# Patient Record
Sex: FEMALE | Race: WHITE
[De-identification: names, ages, dates, MRNs, and addresses within clinical notes are randomized per-mention and may not be internally consistent; named-entity substitution may affect disease eponyms.]

---

## 2016-06-05 VITALS — SYSTOLIC BLOOD PRESSURE: 152 MMHG | DIASTOLIC BLOOD PRESSURE: 78 MMHG

## 2017-06-07 ENCOUNTER — HOSPITAL ENCOUNTER (OUTPATIENT)
Dept: HOSPITAL 61 - MAMMO | Age: 62
Discharge: HOME | End: 2017-06-07
Attending: FAMILY MEDICINE
Payer: COMMERCIAL

## 2017-06-07 DIAGNOSIS — Z12.31: Primary | ICD-10-CM

## 2017-06-08 NOTE — RAD
DATE: 6/7/2017



EXAM: DIGITAL SCREEN BILAT W/CAD



HISTORY: Routine screening



COMPARISON: 4/13/2016



This study was interpreted with the benefit of Computerized Aided Detection

(CAD).





The breast parenchyma is primarily fatty replaced. Breast parenchyma level

density A.





FINDINGS: Residual fibroglandular densities in the breasts are unchanged. No

new or enlarging breast densities are seen. Benign type calcifications are

present bilaterally. No suspicious microcalcifications have developed.  





IMPRESSION: Stable mammograms without evidence of malignancy.





BI-RADS CATEGORY: 2 BENIGN FINDING(S)



RECOMMENDED FOLLOW-UP: 12M 12 MONTH FOLLOW-UP



PQRS compliance statement: Patient information was entered into a reminder

system with a target due date     for the next mammogram.



Mammography is a sensitive method for finding small breast cancers, but it

does not detect them all and is not a substitute for careful clinical

examination.  A negative mammogram does not negate a clinically suspicious

finding and should not result in delay in biopsying a clinically suspicious

abnormality.



"Our facility is accredited by the [employer] Mammography

Program."

## 2018-01-24 ENCOUNTER — HOSPITAL ENCOUNTER (OUTPATIENT)
Dept: HOSPITAL 61 - ECHO | Age: 63
Discharge: HOME | End: 2018-01-24
Attending: INTERNAL MEDICINE
Payer: COMMERCIAL

## 2018-01-24 DIAGNOSIS — J44.9: Primary | ICD-10-CM

## 2018-01-24 DIAGNOSIS — R06.00: ICD-10-CM

## 2018-01-24 PROCEDURE — 93306 TTE W/DOPPLER COMPLETE: CPT

## 2018-06-06 ENCOUNTER — HOSPITAL ENCOUNTER (OUTPATIENT)
Dept: HOSPITAL 61 - US | Age: 63
Discharge: HOME | End: 2018-06-06
Attending: FAMILY MEDICINE
Payer: COMMERCIAL

## 2018-06-06 DIAGNOSIS — R16.0: Primary | ICD-10-CM

## 2018-06-06 DIAGNOSIS — Z90.49: ICD-10-CM

## 2018-06-06 PROCEDURE — 76700 US EXAM ABDOM COMPLETE: CPT

## 2020-06-11 ENCOUNTER — HOSPITAL ENCOUNTER (INPATIENT)
Dept: HOSPITAL 61 - ER | Age: 65
LOS: 4 days | Discharge: HOME | DRG: 871 | End: 2020-06-15
Attending: INTERNAL MEDICINE | Admitting: INTERNAL MEDICINE
Payer: COMMERCIAL

## 2020-06-11 VITALS — DIASTOLIC BLOOD PRESSURE: 62 MMHG | SYSTOLIC BLOOD PRESSURE: 116 MMHG

## 2020-06-11 VITALS — HEIGHT: 62 IN | WEIGHT: 293 LBS | BODY MASS INDEX: 53.92 KG/M2

## 2020-06-11 VITALS — DIASTOLIC BLOOD PRESSURE: 62 MMHG | SYSTOLIC BLOOD PRESSURE: 112 MMHG

## 2020-06-11 DIAGNOSIS — E11.22: ICD-10-CM

## 2020-06-11 DIAGNOSIS — E83.42: ICD-10-CM

## 2020-06-11 DIAGNOSIS — F32.9: ICD-10-CM

## 2020-06-11 DIAGNOSIS — Z88.8: ICD-10-CM

## 2020-06-11 DIAGNOSIS — K76.0: ICD-10-CM

## 2020-06-11 DIAGNOSIS — Z82.49: ICD-10-CM

## 2020-06-11 DIAGNOSIS — N20.0: ICD-10-CM

## 2020-06-11 DIAGNOSIS — Z90.710: ICD-10-CM

## 2020-06-11 DIAGNOSIS — E78.5: ICD-10-CM

## 2020-06-11 DIAGNOSIS — J44.9: ICD-10-CM

## 2020-06-11 DIAGNOSIS — I50.9: ICD-10-CM

## 2020-06-11 DIAGNOSIS — Z90.49: ICD-10-CM

## 2020-06-11 DIAGNOSIS — Z83.3: ICD-10-CM

## 2020-06-11 DIAGNOSIS — N18.9: ICD-10-CM

## 2020-06-11 DIAGNOSIS — N17.0: ICD-10-CM

## 2020-06-11 DIAGNOSIS — N39.0: ICD-10-CM

## 2020-06-11 DIAGNOSIS — E86.9: ICD-10-CM

## 2020-06-11 DIAGNOSIS — K21.9: ICD-10-CM

## 2020-06-11 DIAGNOSIS — E78.00: ICD-10-CM

## 2020-06-11 DIAGNOSIS — F41.9: ICD-10-CM

## 2020-06-11 DIAGNOSIS — A41.9: Primary | ICD-10-CM

## 2020-06-11 DIAGNOSIS — N12: ICD-10-CM

## 2020-06-11 DIAGNOSIS — I13.0: ICD-10-CM

## 2020-06-11 DIAGNOSIS — E66.01: ICD-10-CM

## 2020-06-11 DIAGNOSIS — I25.10: ICD-10-CM

## 2020-06-11 DIAGNOSIS — Z80.8: ICD-10-CM

## 2020-06-11 LAB
ALBUMIN SERPL-MCNC: 3.3 G/DL (ref 3.4–5)
ALBUMIN/GLOB SERPL: 0.7 {RATIO} (ref 1–1.7)
ALP SERPL-CCNC: 85 U/L (ref 46–116)
ALT SERPL-CCNC: 19 U/L (ref 14–59)
ANION GAP SERPL CALC-SCNC: 14 MMOL/L (ref 6–14)
APTT BLD: 30 SEC (ref 24–38)
APTT PPP: (no result) S
AST SERPL-CCNC: 27 U/L (ref 15–37)
BACTERIA #/AREA URNS HPF: (no result) /HPF
BASOPHILS # BLD AUTO: 0.1 X10^3/UL (ref 0–0.2)
BASOPHILS NFR BLD: 1 % (ref 0–3)
BILIRUB SERPL-MCNC: 0.8 MG/DL (ref 0.2–1)
BILIRUB UR QL STRIP: (no result)
BUN SERPL-MCNC: 72 MG/DL (ref 7–20)
BUN/CREAT SERPL: 16 (ref 6–20)
CALCIUM SERPL-MCNC: 9.1 MG/DL (ref 8.5–10.1)
CHLORIDE SERPL-SCNC: 95 MMOL/L (ref 98–107)
CK SERPL-CCNC: 522 U/L (ref 26–192)
CO2 SERPL-SCNC: 25 MMOL/L (ref 21–32)
CREAT SERPL-MCNC: 4.5 MG/DL (ref 0.6–1)
EOSINOPHIL NFR BLD: 0 % (ref 0–3)
EOSINOPHIL NFR BLD: 0 X10^3/UL (ref 0–0.7)
ERYTHROCYTE [DISTWIDTH] IN BLOOD BY AUTOMATED COUNT: 15 % (ref 11.5–14.5)
FIBRINOGEN PPP-MCNC: (no result) MG/DL
GFR SERPLBLD BASED ON 1.73 SQ M-ARVRAT: 9.8 ML/MIN
GLOBULIN SER-MCNC: 4.6 G/DL (ref 2.2–3.8)
GLUCOSE SERPL-MCNC: 187 MG/DL (ref 70–99)
HCT VFR BLD CALC: 36.2 % (ref 36–47)
HGB BLD-MCNC: 12.4 G/DL (ref 12–15.5)
LIPASE: 191 U/L (ref 73–393)
LYMPHOCYTES # BLD: 2.1 X10^3/UL (ref 1–4.8)
LYMPHOCYTES NFR BLD AUTO: 13 % (ref 24–48)
MAGNESIUM SERPL-MCNC: 1.5 MG/DL (ref 1.8–2.4)
MCH RBC QN AUTO: 29 PG (ref 25–35)
MCHC RBC AUTO-ENTMCNC: 34 G/DL (ref 31–37)
MCV RBC AUTO: 84 FL (ref 79–100)
MONO #: 1.5 X10^3/UL (ref 0–1.1)
MONOCYTES NFR BLD: 10 % (ref 0–9)
NEUT #: 12 X10^3/UL (ref 1.8–7.7)
NEUTROPHILS NFR BLD AUTO: 76 % (ref 31–73)
NITRITE UR QL STRIP: NEGATIVE
PH UR STRIP: 6 [PH]
PLATELET # BLD AUTO: 400 X10^3/UL (ref 140–400)
POTASSIUM SERPL-SCNC: 4.9 MMOL/L (ref 3.5–5.1)
PROT SERPL-MCNC: 7.9 G/DL (ref 6.4–8.2)
PROT UR STRIP-MCNC: 100 MG/DL
PROTHROMBIN TIME: 14 SEC (ref 11.7–14)
RBC # BLD AUTO: 4.33 X10^6/UL (ref 3.5–5.4)
RBC #/AREA URNS HPF: (no result) /HPF (ref 0–2)
SODIUM SERPL-SCNC: 134 MMOL/L (ref 136–145)
SQUAMOUS #/AREA URNS LPF: (no result) /LPF
UROBILINOGEN UR-MCNC: 0.2 MG/DL
WBC # BLD AUTO: 15.7 X10^3/UL (ref 4–11)
WBC #/AREA URNS HPF: >40 /HPF (ref 0–4)

## 2020-06-11 PROCEDURE — 74176 CT ABD & PELVIS W/O CONTRAST: CPT

## 2020-06-11 PROCEDURE — 71250 CT THORAX DX C-: CPT

## 2020-06-11 PROCEDURE — 36415 COLL VENOUS BLD VENIPUNCTURE: CPT

## 2020-06-11 PROCEDURE — 83550 IRON BINDING TEST: CPT

## 2020-06-11 PROCEDURE — 83690 ASSAY OF LIPASE: CPT

## 2020-06-11 PROCEDURE — 82553 CREATINE MB FRACTION: CPT

## 2020-06-11 PROCEDURE — 82378 CARCINOEMBRYONIC ANTIGEN: CPT

## 2020-06-11 PROCEDURE — 96376 TX/PRO/DX INJ SAME DRUG ADON: CPT

## 2020-06-11 PROCEDURE — 83735 ASSAY OF MAGNESIUM: CPT

## 2020-06-11 PROCEDURE — 80053 COMPREHEN METABOLIC PANEL: CPT

## 2020-06-11 PROCEDURE — 96375 TX/PRO/DX INJ NEW DRUG ADDON: CPT

## 2020-06-11 PROCEDURE — 85610 PROTHROMBIN TIME: CPT

## 2020-06-11 PROCEDURE — 82962 GLUCOSE BLOOD TEST: CPT

## 2020-06-11 PROCEDURE — 83605 ASSAY OF LACTIC ACID: CPT

## 2020-06-11 PROCEDURE — 80048 BASIC METABOLIC PNL TOTAL CA: CPT

## 2020-06-11 PROCEDURE — 96365 THER/PROPH/DIAG IV INF INIT: CPT

## 2020-06-11 PROCEDURE — 83540 ASSAY OF IRON: CPT

## 2020-06-11 PROCEDURE — 81001 URINALYSIS AUTO W/SCOPE: CPT

## 2020-06-11 PROCEDURE — 82010 KETONE BODYS QUAN: CPT

## 2020-06-11 PROCEDURE — 87086 URINE CULTURE/COLONY COUNT: CPT

## 2020-06-11 PROCEDURE — G0378 HOSPITAL OBSERVATION PER HR: HCPCS

## 2020-06-11 PROCEDURE — 93005 ELECTROCARDIOGRAM TRACING: CPT

## 2020-06-11 PROCEDURE — 76857 US EXAM PELVIC LIMITED: CPT

## 2020-06-11 PROCEDURE — 87040 BLOOD CULTURE FOR BACTERIA: CPT

## 2020-06-11 PROCEDURE — 85025 COMPLETE CBC W/AUTO DIFF WBC: CPT

## 2020-06-11 PROCEDURE — 82607 VITAMIN B-12: CPT

## 2020-06-11 PROCEDURE — 85730 THROMBOPLASTIN TIME PARTIAL: CPT

## 2020-06-11 PROCEDURE — 86304 IMMUNOASSAY TUMOR CA 125: CPT

## 2020-06-11 PROCEDURE — 96361 HYDRATE IV INFUSION ADD-ON: CPT

## 2020-06-11 PROCEDURE — 84484 ASSAY OF TROPONIN QUANT: CPT

## 2020-06-11 NOTE — PHYS DOC
Past Medical History


Past Medical History:  CHF, COPD, Diabetes-Type II, High Cholesterol, Hype

rtension, Other


Additional Past Medical Histor:  generalized debility


Past Surgical History:  Cholecystectomy, Hysterectomy, Tubal ligation, Other


Additional Past Surgical Histo:  bladder,cyst removed rt leg


Smoking Status:  Never Smoker


Alcohol Use:  None


Drug Use:  None





General Adult


EDM:


Chief Complaint:  MULTIPLE COMPLAINTS





HPI:


HPI:





Patient is a 65 year old female presents with 3-day history of right flank pain,

right abdominal pain, sweatiness, and shortness of breath.  Patient reports 

associated loss of appetite.  Patient denies any fever or chills.  Denies chest 

pain.  Reports dysuria.  Patient was seen by Dr. Deleon (PCP), who sent patient 

in to ED to be evaluated.  Denies known exposure to COVID-19.  Denies recent 

travel.





Review of Systems:


Review of Systems:





Constitutional: Denies fever or chills; reports generalized malaise


Eyes: Denies redness or eye pain 


HENT: Denies nasal congestion or sore throat


Respiratory: Reports cough and shortness of breath


Cardiovascular: Denies chest pain or palpitations


GI: Reports abdominal pain and nausea; denies vomiting


: Reports dysuria; denies hematuria


Musculoskeletal: Denies calves pain; reports right flank pain


Integument: Denies rash or skin lesions; reports diaphoresis


Neurologic: Denies headache, focal weakness or sensory changes





Complete systems were reviewed and found to be within normal limits, except as 

documented in this note.





Current Medications:





Current Medications








 Medications


  (Trade)  Dose


 Ordered  Sig/[facility]  Start Time


 Stop Time Status Last Admin


Dose Admin


 


 Famotidine


  (Pepcid Vial)  20 mg  1X  ONCE  6/11/20 16:15


 6/11/20 16:22 DC  





 


 Ondansetron HCl


  (Zofran)  4 mg  1X  ONCE  6/11/20 16:15


 6/11/20 16:22 DC  





 


 Sodium Chloride  1,000 ml @ 


 1,000 mls/hr  1X  ONCE  6/11/20 16:15


 6/11/20 17:14   














Allergies:


Allergies:





Allergies








Coded Allergies Type Severity Reaction Last Updated Verified


 


  No Known Medication Allergies Allergy Unknown  12/26/18 Yes


 


  sitagliptin Adverse Reaction Intermediate HAIR LOSS 12/26/18 Yes











Physical Exam:


PE:





Constitutional: Well developed, obese, uncomfortable


HENT: Normocephalic, atraumatic


Eyes: Conjunctiva normal, no discharge


Neck: Normal range of motion, no tenderness, supple


Cardiovascular: Heart rate normal, regular rhythm


Lungs & Thorax:  Bilateral breath sounds diminished at bases, + wheezing


Abdomen: Soft, distended, no tenderness


Skin: Warm, dry, no erythema, no rash


Back: No tenderness, right CVA tenderness


Extremities: No tenderness, ROM intact, trace bilateral lower extremity edema


Neurologic: Alert and oriented X 3,  no focal deficits noted


Psychologic: Affect normal, judgment normal





EKG:


EKG:


@1625 NSR at 99bpm, NO ST elevation, QRS 78ms, QT/QTc 328/421ms





Radiology/Procedures:


Radiology/Procedures:


PROCEDURE: CT CHEST ABDOMEN PELVIS WO





STUDY: CT chest, abdomen and pelvis without contrast


 


INDICATION: Shortness of air. Right flank pain.


 


COMPARISON: None available.


 


TECHNIQUE: Helical CT imaging of the chest, abdomen and pelvis performed 


without the use of intravenous contrast. Coronal and sagittal reformats 


were obtained.


 


One or more of the following individualized dose reduction techniques were


utilized for this examination:  


 


1. Automated exposure control


2. Adjustment of the mA and/or kV according to patient size


3. Use of iterative reconstruction technique.


 


FINDINGS:


 


CHEST:


 


Degraded study due to body habitus. Portions of the body wall soft tissues


are excluded from the field-of-view.


 


Calcific coronary artery disease. Nonaneurysmal aorta. Mitral annular 


calcifications.


 


No pericardial effusion or ileus tunnel/hilar lymphadenopathy.


 


Left upper lobe calcified granuloma. No suspicious nodule or infiltrate. 


Mild right more so than left basilar volume loss.


 


No acute abnormality of the body wall soft tissues included in the 


field-of-view. Scattered degenerative osseous findings.


 


Abdomen/pelvis:


 


Portions of the body wall as well as a small portion of the right abdomen 


are excluded from the field-of-view. Inherently limited study without 


intravenous contrast. No focal hepatic parenchymal abnormality is 


identified. The liver is prominent in size. Status post cholecystectomy. 


Unremarkable pancreas. Mild splenomegaly. No adrenal gland mass. 


 


Bilateral perinephric fat stranding slightly more pronounced on the right.


Somewhat linear intrarenal stone on image 43 series 6 measuring 7 mm AP. 


No hydroureteronephrosis. Mostly decompressed urinary bladder. No uterus 


is seen. Radiodensities at the left adnexa could be related to surgical 


resection of the left ovary. No normal ovary is seen on the right. At the 


lower aspect of the abdomen/upper pelvis is a lobulated mass with an 


internal density of approximately 40 Hounsfield units that measures 


approximately 9.3 x 9.2 x 6.9 cm. The right ureter appears to be displaced


medially by this mass but not obstructed.


 


No focal abnormality of the colon. Some incompletely formed stool is seen 


within the more proximal colon. The appendix is not well delineated. 


Nonobstructed small bowel. Limited assessment of the stomach as well as 


the rest of the bowel without oral contrast.


 


Mild scattered calcific atherosclerosis. Nonaneurysmal aorta.


 


Scattered degenerative osseous findings with greatest involvement of the 


lower lumbar facet joints. Grade 1 anterolisthesis of L4 on L5. Osseous 


neural foraminal encroachment on the right at L4-L5.


 


IMPRESSION:


 


CHEST:


1. Degraded study due to body habitus. Portions of the chest wall are 


outside the field-of-view.


2. No acute abnormality seen throughout the chest.


3. Chronic findings to include calcific coronary artery disease.


 


Abdomen/pelvis:


1. Portions of the body wall and a small portion of the right abdomen are 


excluded from the field-of-view.


2. Mild right more so than left perinephric fat stranding but no 


obstructing stone is seen. There is a 7 mm somewhat linear intrarenal 


stone on the right. In the setting of perinephric fat stranding consider 


correlation with urinalysis to exclude a urinary tract infection.


3. Lobulated mass-like structure at the lower abdomen extending into the 


upper pelvis measuring 9.3 x 9.2 x 6.9 cm. No uterus is seen and the left 


ovary may be surgically absent. No normal right ovary is identified and it


is possible that this mass is ovarian in origin. The internal density is 


approximately 40 Hounsfield units confirming that this is not a simple 


ovarian cyst or simple postoperative fluid collection. Sonography would be


useful to help assess for malignant features but could be limited due to 


patient body habitus. If renal function permits the use of contrast, 


eventual CT or MRI of this region could be performed. If the patient 


cannot receive contrast and ultrasound is unrevealing, unenhanced MRI 


would be the next best study.


4. Hepatosplenomegaly.


 


Electronically signed by: MONIQUE MADDOX MD (6/11/2020 6:13 PM) [id number]





Course & Med Decision Making:


Course & Med Decision Making


Pertinent Labs and Imaging studies reviewed. (See chart for details)





Patient presents after being evaluated by Dr. Deleon with 3-day history of 

worsening shortness of air, abdominal pain, and right flank pain.  Patient 

reports associated loss of appetite.  Labs obtained and posted to chart.  

Creatinine 4.5. UA with findings significant for infection.  WBC elevated. 

Lactic acid WNL. 





CT abd/pelvis with findings of perinephric fat stranding R > L.  Notation of 

large lower abdominal mass also noted.





Patient requiring admission for further evaluation and treatment. Discussed with

Dr. Burgos (hospitalist) who is in agreement with admission. Discussed findings 

and plan with patient, who acknowledges understanding and agreement.





Dragon Disclaimer:


Dragon Disclaimer:


This electronic medical record was generated, in whole or in part, using a voice

recognition dictation system.





Departure


Departure


Impression:  


   Primary Impression:  


   Pyelonephritis


   Additional Impressions:  


   Renal failure


   Qualified Codes:  N17.9 - Acute kidney failure, unspecified


   Hypomagnesemia


   Abdominal mass


   Qualified Codes:  R19.03 - Right lower quadrant abdominal swelling, mass and 

   lump


Disposition:  09 ADMITTED AS INPATIENT


Admitting Physician:  HIMS ([place])


Condition:  STABLE


Referrals:  


AYAKA MARSHALL MD (PCP)





Justicifation of Admission Dx:


Justifications for Admission:


Justification of Admission Dx:  Yes


Acute Renal Failure:  Serum Cr > 4mg/dL


Comments:


Abdominal mass, Pyelonephritis











WAI SHAIKH DO             Jun 11, 2020 16:23

## 2020-06-11 NOTE — HP
ADMIT DATE:  06/11/2020



CHIEF COMPLAINT:  Multiple complaints.



HISTORY OF PRESENT ILLNESS:  The patient is a pleasant 65-year-old female who

presented with right abdominal pain.  She has been feeling clammy.  She has been

short of breath.  She also has a decreased appetite, this has been occurring for

several days.  She was seen by her primary care doctor and was told to go to the

ER for further evaluation.  While in the ER, we have noticed that she is in

acute renal failure with a creatinine of 4.5.  She also has a 10 cm mass in the

right lower quadrant of her abdomen/pelvis.  I discussed the case with ER

physician.  We are going to admit the patient and consult OB/GYN, Oncology and

Nephrology.



PAST MEDICAL HISTORY:  CHF, COPD, diabetes, hypertension, hyperlipidemia,

obesity, cholecystectomy, hysterectomy, tubal ligation, bladder cyst, right leg

surgery.



ALLERGIES:  SITAGLIPTIN.



FAMILY HISTORY:  Diabetes.



SOCIAL HISTORY:  She does not drink, smoke or take drugs.



MEDICATIONS:  Reviewed, please refer to the MRAD.



REVIEW OF SYSTEMS: 

GENERAL:  No history of weight change, weakness or fevers.

SKIN:  No bruising, hair changes or rashes.

EYES:  No blurred, double or loss of vision.

NOSE AND THROAT:  No history of nosebleeds, hoarseness or sore throat.

HEART:  No history of palpitations, chest pain or shortness of breath on

exertion.

LUNGS:  Denies cough, hemoptysis or wheezing.  She complains of some shortness

of breath.

GASTROINTESTINAL:  She complains of abdominal pain.

GENITOURINARY:  No history of frequency, urgency, hesitancy or nocturia.

NEUROLOGIC:  Denies history of numbness, tingling, tremor or weakness.

PSYCHIATRIC:  No history of panic, anxiety or depression.

ENDOCRINE:  No history of heat or cold intolerance, polyuria or polydipsia.

EXTREMITIES:  Denies muscle weakness, joint pain, pain on walking or stiffness.



PHYSICAL EXAMINATION:

VITALS:  Within normal limits and are stable.

GENERAL:  No apparent distress.  Alert and oriented.  She is little obese.

HEENT:   Normal cephalic atraumatic, external auditory canals are patent

EYES:  Extraocular muscles are intact, pupils are equally round and reactive to

light and accommodation

MUSCULOSKELETAL:  Well developed, well nourished, good range of motion

ENDOCRINE:  No thyromegaly was palpated

LYMPHATICS:  No cervical chain or axillary nodes were noted

HEMATOPOIETIC:  No bruising

NECK:  Supple, no JVD, no thyromegaly was noted.

LUNGS:   Clear to auscultation in all lung fields without rhonchi or wheezing.

HEART:  RRR, S1, S2 present.  Peripheral pulses intact, no obvious murmurs were

noted.

ABDOMEN:  Soft.  Tender in the right lower quadrant to palpation.   Positive

bowel sounds no organomegaly, normal bowel sounds.

EXTREMITIES:   Without any cyanosis, clubbing, or edema.  Pedal pulses intact,

Homans sign is negative.

NEUROLOGIC:  Normal speech, normal tone.  A & O x3, moves all extremities, no

obvious focal deficits.

PSYCHIATRIC:  Normal affect, normal mood.  Stable.

SKIN:  No ulcerations or rashes, good skin turgor, no jaundice.

VASCULAR:  Good capillary refill, neurovascular bundle appears to be intact.



LABORATORY DATA:  CT of the abdomen shows a lobulated mass-like structure in the

lower abdomen extending into the upper pelvis measuring 9.3 x 9.2 x 6.9 cm. 

Creatinine is 4.2.



ASSESSMENT AND PLAN:  Acute kidney injury with incidental finding of possible

malignancy in the abdomen/pelvis.  The patient will be admitted.  We will

consult Nephrology, consult OB/GYN, and consult Oncology.  Home meds, DVT

prophylaxis.  Full code.  IV fluids.  She also has UTI, so we gave her some IV

Rocephin.  Her magnesium was a little low.  We gave her 2 grams of IV magnesium

sulfate.  Trend labs, p.r.n. Zofran, p.r.n. Pepcid.



LONG-TERM PROGNOSIS:  Guarded.

 



______________________________

ONI HALE DO



DR:  CHILO/javier  JOB#:  [id number] / [id number]

DD:  06/11/2020 20:22  DT:  06/11/2020 20:40



WAI Salguero MD

## 2020-06-11 NOTE — NUR
The patient, CECI GEIGER, 64 y/o, F admitted by ONI HALE III, DO, was given written 
information regarding hospital policies, unit procedures and contact persons. patient was 
transferred to room via ED bed assisted by ED staff member. 



Valuables were checked and noted. patient is currently laying in bed watching TV and talking 
on the phone. Patient denies any needs at this time. This RN will continue to monitor the 
patient at this time.

## 2020-06-11 NOTE — RAD
STUDY: CT chest, abdomen and pelvis without contrast

 

INDICATION: Shortness of air. Right flank pain.

 

COMPARISON: None available.

 

TECHNIQUE: Helical CT imaging of the chest, abdomen and pelvis performed 

without the use of intravenous contrast. Coronal and sagittal reformats 

were obtained.

 

One or more of the following individualized dose reduction techniques were

utilized for this examination:  

 

1. Automated exposure control

2. Adjustment of the mA and/or kV according to patient size

3. Use of iterative reconstruction technique.

 

FINDINGS:

 

CHEST:

 

Degraded study due to body habitus. Portions of the body wall soft tissues

are excluded from the field-of-view.

 

Calcific coronary artery disease. Nonaneurysmal aorta. Mitral annular 

calcifications.

 

No pericardial effusion or ileus tunnel/hilar lymphadenopathy.

 

Left upper lobe calcified granuloma. No suspicious nodule or infiltrate. 

Mild right more so than left basilar volume loss.

 

No acute abnormality of the body wall soft tissues included in the 

field-of-view. Scattered degenerative osseous findings.

 

Abdomen/pelvis:

 

Portions of the body wall as well as a small portion of the right abdomen 

are excluded from the field-of-view. Inherently limited study without 

intravenous contrast. No focal hepatic parenchymal abnormality is 

identified. The liver is prominent in size. Status post cholecystectomy. 

Unremarkable pancreas. Mild splenomegaly. No adrenal gland mass. 

 

Bilateral perinephric fat stranding slightly more pronounced on the right.

Somewhat linear intrarenal stone on image 43 series 6 measuring 7 mm AP. 

No hydroureteronephrosis. Mostly decompressed urinary bladder. No uterus 

is seen. Radiodensities at the left adnexa could be related to surgical 

resection of the left ovary. No normal ovary is seen on the right. At the 

lower aspect of the abdomen/upper pelvis is a lobulated mass with an 

internal density of approximately 40 Hounsfield units that measures 

approximately 9.3 x 9.2 x 6.9 cm. The right ureter appears to be displaced

medially by this mass but not obstructed.

 

No focal abnormality of the colon. Some incompletely formed stool is seen 

within the more proximal colon. The appendix is not well delineated. 

Nonobstructed small bowel. Limited assessment of the stomach as well as 

the rest of the bowel without oral contrast.

 

Mild scattered calcific atherosclerosis. Nonaneurysmal aorta.

 

Scattered degenerative osseous findings with greatest involvement of the 

lower lumbar facet joints. Grade 1 anterolisthesis of L4 on L5. Osseous 

neural foraminal encroachment on the right at L4-L5.

 

IMPRESSION:

 

CHEST:

1. Degraded study due to body habitus. Portions of the chest wall are 

outside the field-of-view.

2. No acute abnormality seen throughout the chest.

3. Chronic findings to include calcific coronary artery disease.

 

Abdomen/pelvis:

1. Portions of the body wall and a small portion of the right abdomen are 

excluded from the field-of-view.

2. Mild right more so than left perinephric fat stranding but no 

obstructing stone is seen. There is a 7 mm somewhat linear intrarenal 

stone on the right. In the setting of perinephric fat stranding consider 

correlation with urinalysis to exclude a urinary tract infection.

3. Lobulated mass-like structure at the lower abdomen extending into the 

upper pelvis measuring 9.3 x 9.2 x 6.9 cm. No uterus is seen and the left 

ovary may be surgically absent. No normal right ovary is identified and it

is possible that this mass is ovarian in origin. The internal density is 

approximately 40 Hounsfield units confirming that this is not a simple 

ovarian cyst or simple postoperative fluid collection. Sonography would be

useful to help assess for malignant features but could be limited due to 

patient body habitus. If renal function permits the use of contrast, 

eventual CT or MRI of this region could be performed. If the patient 

cannot receive contrast and ultrasound is unrevealing, unenhanced MRI 

would be the next best study.

4. Hepatosplenomegaly.

 

Electronically signed by: MONIQUE MADDOX MD (6/11/2020 6:13 PM) [id number]

## 2020-06-12 VITALS — DIASTOLIC BLOOD PRESSURE: 49 MMHG | SYSTOLIC BLOOD PRESSURE: 111 MMHG

## 2020-06-12 VITALS — DIASTOLIC BLOOD PRESSURE: 41 MMHG | SYSTOLIC BLOOD PRESSURE: 112 MMHG

## 2020-06-12 VITALS — DIASTOLIC BLOOD PRESSURE: 66 MMHG | SYSTOLIC BLOOD PRESSURE: 121 MMHG

## 2020-06-12 VITALS — SYSTOLIC BLOOD PRESSURE: 110 MMHG | DIASTOLIC BLOOD PRESSURE: 41 MMHG

## 2020-06-12 VITALS — SYSTOLIC BLOOD PRESSURE: 106 MMHG | DIASTOLIC BLOOD PRESSURE: 62 MMHG

## 2020-06-12 VITALS — SYSTOLIC BLOOD PRESSURE: 109 MMHG | DIASTOLIC BLOOD PRESSURE: 46 MMHG

## 2020-06-12 LAB
ALBUMIN SERPL-MCNC: 3 G/DL (ref 3.4–5)
ALBUMIN/GLOB SERPL: 0.7 {RATIO} (ref 1–1.7)
ALP SERPL-CCNC: 84 U/L (ref 46–116)
ALT SERPL-CCNC: 20 U/L (ref 14–59)
ANION GAP SERPL CALC-SCNC: 13 MMOL/L (ref 6–14)
AST SERPL-CCNC: 25 U/L (ref 15–37)
BASOPHILS # BLD AUTO: 0.1 X10^3/UL (ref 0–0.2)
BASOPHILS NFR BLD: 1 % (ref 0–3)
BILIRUB SERPL-MCNC: 0.4 MG/DL (ref 0.2–1)
BUN SERPL-MCNC: 72 MG/DL (ref 7–20)
BUN/CREAT SERPL: 23 (ref 6–20)
CALCIUM SERPL-MCNC: 8.7 MG/DL (ref 8.5–10.1)
CHLORIDE SERPL-SCNC: 97 MMOL/L (ref 98–107)
CO2 SERPL-SCNC: 26 MMOL/L (ref 21–32)
CREAT SERPL-MCNC: 3.2 MG/DL (ref 0.6–1)
EOSINOPHIL NFR BLD: 0.1 X10^3/UL (ref 0–0.7)
EOSINOPHIL NFR BLD: 1 % (ref 0–3)
ERYTHROCYTE [DISTWIDTH] IN BLOOD BY AUTOMATED COUNT: 15 % (ref 11.5–14.5)
GFR SERPLBLD BASED ON 1.73 SQ M-ARVRAT: 14.5 ML/MIN
GLOBULIN SER-MCNC: 4.4 G/DL (ref 2.2–3.8)
GLUCOSE SERPL-MCNC: 221 MG/DL (ref 70–99)
HCT VFR BLD CALC: 35.1 % (ref 36–47)
HGB BLD-MCNC: 11.8 G/DL (ref 12–15.5)
LYMPHOCYTES # BLD: 1.9 X10^3/UL (ref 1–4.8)
LYMPHOCYTES NFR BLD AUTO: 20 % (ref 24–48)
MCH RBC QN AUTO: 28 PG (ref 25–35)
MCHC RBC AUTO-ENTMCNC: 34 G/DL (ref 31–37)
MCV RBC AUTO: 83 FL (ref 79–100)
MONO #: 1.1 X10^3/UL (ref 0–1.1)
MONOCYTES NFR BLD: 12 % (ref 0–9)
NEUT #: 6.3 X10^3/UL (ref 1.8–7.7)
NEUTROPHILS NFR BLD AUTO: 67 % (ref 31–73)
PLATELET # BLD AUTO: 344 X10^3/UL (ref 140–400)
POTASSIUM SERPL-SCNC: 4.2 MMOL/L (ref 3.5–5.1)
PROT SERPL-MCNC: 7.4 G/DL (ref 6.4–8.2)
RBC # BLD AUTO: 4.2 X10^6/UL (ref 3.5–5.4)
SODIUM SERPL-SCNC: 136 MMOL/L (ref 136–145)
WBC # BLD AUTO: 9.5 X10^3/UL (ref 4–11)

## 2020-06-12 RX ADMIN — ATORVASTATIN CALCIUM SCH MG: 10 TABLET, FILM COATED ORAL at 21:26

## 2020-06-12 RX ADMIN — PANTOPRAZOLE SODIUM SCH MG: 40 TABLET, DELAYED RELEASE ORAL at 14:03

## 2020-06-12 RX ADMIN — POTASSIUM CHLORIDE SCH MEQ: 1500 TABLET, EXTENDED RELEASE ORAL at 16:39

## 2020-06-12 RX ADMIN — CEFTRIAXONE SCH GM: 1 INJECTION, POWDER, FOR SOLUTION INTRAMUSCULAR; INTRAVENOUS at 14:03

## 2020-06-12 RX ADMIN — FUROSEMIDE SCH MG: 40 TABLET ORAL at 14:04

## 2020-06-12 RX ADMIN — REPAGLINIDE SCH MG: 0.5 TABLET ORAL at 16:39

## 2020-06-12 RX ADMIN — INSULIN LISPRO SCH UNITS: 100 INJECTION, SOLUTION INTRAVENOUS; SUBCUTANEOUS at 17:10

## 2020-06-12 RX ADMIN — BACITRACIN SCH MLS/HR: 5000 INJECTION, POWDER, FOR SOLUTION INTRAMUSCULAR at 14:03

## 2020-06-12 RX ADMIN — CITALOPRAM HYDROBROMIDE SCH MG: 20 TABLET ORAL at 14:04

## 2020-06-12 RX ADMIN — TIZANIDINE SCH MG: 4 TABLET ORAL at 21:26

## 2020-06-12 RX ADMIN — POTASSIUM CHLORIDE SCH MEQ: 1500 TABLET, EXTENDED RELEASE ORAL at 14:04

## 2020-06-12 RX ADMIN — INSULIN GLARGINE SCH UNIT: 100 INJECTION, SOLUTION SUBCUTANEOUS at 21:00

## 2020-06-12 RX ADMIN — ASPIRIN 81 MG SCH MG: 81 TABLET ORAL at 14:04

## 2020-06-12 RX ADMIN — BACITRACIN SCH MLS/HR: 5000 INJECTION, POWDER, FOR SOLUTION INTRAMUSCULAR at 23:04

## 2020-06-12 RX ADMIN — LEVOTHYROXINE SODIUM SCH MCG: 88 TABLET ORAL at 14:04

## 2020-06-12 NOTE — EKG
[facility]

              [address] [place] [address]

Test Date:    2020               Test Time:    16:25:06

Pat Name:     CECI GEIGER             Department:   

Patient ID:   [id number]           Room:         [facility]

Gender:       F                        Technician:   

:          [id number]               Requested By: WAI SHAIKH

Order Number: [id number]           Reading MD:   Ok Frias

                                 Measurements

Intervals                              Axis          

Rate:         99                       P:            41

MS:           126                      QRS:          28

QRSD:         78                       T:            65

QT:           328                                    

QTc:          421                                    

                           Interpretive Statements

SINUS RHYTHM





Electronically Signed On 2020 16:43:20 CDT by Ok Frias

## 2020-06-12 NOTE — PDOC2
CONSULT


Date of Consult


Date of Consult


DATE: 6/12/20 


TIME: 11:07





Reason for Consult


Reason for Consult:


RENAL FAILURE





Referring Physician


Referring Physician:


TAMRA





Identification/Chief Complaint


Chief Complaint


ABD PAIN





Source


Source:  Chart review, Patient





History of Present Illness


Reason for Visit:


THIS IS A 65 YR CF WITH RIGHT SIDED ABD AND BILATERAL FLANK PAIN. IMAGING 

CONCERNING FOR INTRARENAL STONE ON THE RIGHT AND STRANDING CONCERNING FOR 

INFECTION. IMAGING ALSO CONCERNING FOR RLQ MASS. KNOWN CKD PER PT. LAST KNOWN 

LABS HERE FROM 2016 INDICATED NO CKD. NO NEPHROTOXINS NOTED. STATES THAT FOR THE

LAST 4 DAYS SHE HAS NOT BEEN ABLE TO EAT MUCH DUE TO N/V AND DIARRHEA. NAUSEA 

PERSISTS BUT DIARRHEA IS BETTER. DENIED ANY FEVERS





Past Medical History


Past Medical History





PAST MEDICAL HISTORY:  CHF, COPD, diabetes, hypertension, hyperlipidemia,


obesity, cholecystectomy, hysterectomy, tubal ligation, bladder cyst, right leg


surgery.


Infectious disease:  No pertinent hx


Renal/:  No pertinent hx





Past Surgical History


Past Surgical History


AS ABOVE





Family History


Family History:  Hypertension





Current Problem List


Problem List


Problems


Medical Problems:


(1) Abdominal mass


Status: Acute  





(2) Hypomagnesemia


Status: Acute  





(3) Pyelonephritis


Status: Acute  





(4) Renal failure


Status: Acute  











Current Medications


Current Medications





Current Medications


Ondansetron HCl (Zofran) 4 mg 1X  ONCE IVP  Last administered on 6/11/20at 

16:54;  Start 6/11/20 at 16:15;  Stop 6/11/20 at 16:22;  Status DC


Famotidine (Pepcid Vial) 20 mg 1X  ONCE IVP  Last administered on 6/11/20at 

16:55;  Start 6/11/20 at 16:15;  Stop 6/11/20 at 16:22;  Status DC


Sodium Chloride 1,000 ml @  1,000 mls/hr 1X  ONCE IV  Last administered on 

6/11/20at 16:53;  Start 6/11/20 at 16:15;  Stop 6/11/20 at 17:14;  Status DC


Ceftriaxone Sodium (Rocephin) 1 gm 1X  ONCE IVP  Last administered on 6/11/20at 

18:24;  Start 6/11/20 at 18:00;  Stop 6/11/20 at 18:01;  Status DC


Magnesium Sulfate 50 ml @ 25 mls/hr 1X  ONCE IV  Last administered on 6/11/20at 

18:31;  Start 6/11/20 at 18:15;  Stop 6/11/20 at 20:14;  Status DC





Allergies


Allergies:  


Coded Allergies:  


     sitagliptin (Verified  Adverse Reaction, Intermediate, HAIR LOSS, 12/26/18)





ROS


General:  YES: Fatigue, Malaise, Appetite


PSYCHOLOGICAL ROS:  YES: Anxiety, Depression


Eyes:  Yes Decreased vision


ALLERGY AND IMMUNOLOGY:  YES: Seasonal Allergies


Respiratory:  YES: Cough


Gastrointestinal:  Yes Nausea, Yes Vomiting, Yes Diarrhea


Genitourinary:  YES Other (DECREASED UO)


Musculoskeletal:  Yes Muscular Weakness


Neurological:  Yes Weakness


Skin:  Yes Dry Skin





Physical Exam


General:  Alert, Oriented X3, Cooperative, No acute distress


HEENT:  Atraumatic, PERRLA, EOMI, Mucous membr. moist/pink


Lungs:  Clear to auscultation


Heart:  Regular rate


Abdomen:  Normal bowel sounds, Soft, No tenderness


Extremities:  No cyanosis


Skin:  No breakdown


Neuro:  Normal speech


Psych/Mental Status:  Mental status NL, Mood NL


MUSCULOSKELETAL:  No joint tenderness, No deformity





Vitals


VITALS





Vital Signs








  Date Time  Temp Pulse Resp B/P (MAP) Pulse Ox O2 Delivery O2 Flow Rate FiO2


 


6/12/20 07:40      Nasal Cannula 3.0 


 


6/12/20 07:35 98.2 85 18 111/49 (69) 98   





 98.2       











Labs


Labs





Laboratory Tests








Test


 6/11/20


16:50 6/11/20


21:15 6/12/20


07:19 6/12/20


10:30


 


White Blood Count


 15.7 x10^3/uL


(4.0-11.0) 


 


 9.5 x10^3/uL


(4.0-11.0)


 


Red Blood Count


 4.33 x10^6/uL


(3.50-5.40) 


 


 4.20 x10^6/uL


(3.50-5.40)


 


Hemoglobin


 12.4 g/dL


(12.0-15.5) 


 


 11.8 g/dL


(12.0-15.5)


 


Hematocrit


 36.2 %


(36.0-47.0) 


 


 35.1 %


(36.0-47.0)


 


Mean Corpuscular Volume 84 fL ()    83 fL () 


 


Mean Corpuscular Hemoglobin 29 pg (25-35)    28 pg (25-35) 


 


Mean Corpuscular Hemoglobin


Concent 34 g/dL


(31-37) 


 


 34 g/dL


(31-37)


 


Red Cell Distribution Width


 15.0 %


(11.5-14.5) 


 


 15.0 %


(11.5-14.5)


 


Platelet Count


 400 x10^3/uL


(140-400) 


 


 344 x10^3/uL


(140-400)


 


Neutrophils (%) (Auto) 76 % (31-73)    67 % (31-73) 


 


Lymphocytes (%) (Auto) 13 % (24-48)    20 % (24-48) 


 


Monocytes (%) (Auto) 10 % (0-9)    12 % (0-9) 


 


Eosinophils (%) (Auto) 0 % (0-3)    1 % (0-3) 


 


Basophils (%) (Auto) 1 % (0-3)    1 % (0-3) 


 


Neutrophils # (Auto)


 12.0 x10^3/uL


(1.8-7.7) 


 


 6.3 x10^3/uL


(1.8-7.7)


 


Lymphocytes # (Auto)


 2.1 x10^3/uL


(1.0-4.8) 


 


 1.9 x10^3/uL


(1.0-4.8)


 


Monocytes # (Auto)


 1.5 x10^3/uL


(0.0-1.1) 


 


 1.1 x10^3/uL


(0.0-1.1)


 


Eosinophils # (Auto)


 0.0 x10^3/uL


(0.0-0.7) 


 


 0.1 x10^3/uL


(0.0-0.7)


 


Basophils # (Auto)


 0.1 x10^3/uL


(0.0-0.2) 


 


 0.1 x10^3/uL


(0.0-0.2)


 


Prothrombin Time


 14.0 SEC


(11.7-14.0) 


 


 





 


Prothromb Time International


Ratio 1.1 (0.8-1.1) 


 


 


 





 


Activated Partial


Thromboplast Time 30 SEC (24-38) 


 


 


 





 


Urine Collection Type U cath    


 


Urine Color Tatiana    


 


Urine Clarity Cloudy    


 


Urine pH 6.0 (<5.0-8.0)    


 


Urine Specific Gravity


 1.020


(1.000-1.030) 


 


 





 


Urine Protein


 100 mg/dL


(NEG-TRACE) 


 


 





 


Urine Glucose (UA)


 Negative mg/dL


(NEG) 


 


 





 


Urine Ketones (Stick)


 Negative mg/dL


(NEG) 


 


 





 


Urine Blood Large (NEG)    


 


Urine Nitrite Negative (NEG)    


 


Urine Bilirubin Small (NEG)    


 


Urine Urobilinogen Dipstick


 0.2 mg/dL (0.2


mg/dL) 


 


 





 


Urine Leukocyte Esterase Large (NEG)    


 


Urine RBC


 20-40 /HPF


(0-2) 


 


 





 


Urine WBC >40 /HPF (0-4)    


 


Urine Squamous Epithelial


Cells Mod /LPF 


 


 


 





 


Urine Bacteria


 Many /HPF


(0-FEW) 


 


 





 


Sodium Level


 134 mmol/L


(136-145) 


 


 136 mmol/L


(136-145)


 


Potassium Level


 4.9 mmol/L


(3.5-5.1) 


 


 4.2 mmol/L


(3.5-5.1)


 


Chloride Level


 95 mmol/L


() 


 


 97 mmol/L


()


 


Carbon Dioxide Level


 25 mmol/L


(21-32) 


 


 26 mmol/L


(21-32)


 


Anion Gap 14 (6-14)    13 (6-14) 


 


Blood Urea Nitrogen


 72 mg/dL


(7-20) 


 


 72 mg/dL


(7-20)


 


Creatinine


 4.5 mg/dL


(0.6-1.0) 


 


 3.2 mg/dL


(0.6-1.0)


 


Estimated GFR


(Cockcroft-Gault) 9.8 


 


 


 14.5 





 


BUN/Creatinine Ratio 16 (6-20)    23 (6-20) 


 


Glucose Level


 187 mg/dL


(70-99) 


 


 221 mg/dL


(70-99)


 


Lactic Acid Level


 1.7 mmol/L


(0.4-2.0) 1.0 mmol/L


(0.4-2.0) 


 





 


Calcium Level


 9.1 mg/dL


(8.5-10.1) 


 


 8.7 mg/dL


(8.5-10.1)


 


Magnesium Level


 1.5 mg/dL


(1.8-2.4) 


 


 





 


Total Bilirubin


 0.8 mg/dL


(0.2-1.0) 


 


 0.4 mg/dL


(0.2-1.0)


 


Aspartate Amino Transf


(AST/SGOT) 27 U/L (15-37) 


 


 


 25 U/L (15-37) 





 


Alanine Aminotransferase


(ALT/SGPT) 19 U/L (14-59) 


 


 


 20 U/L (14-59) 





 


Alkaline Phosphatase


 85 U/L


() 


 


 84 U/L


()


 


Creatine Kinase


 522 U/L


() 


 


 





 


Creatine Kinase MB (Mass)


 2.0 ng/mL


(0.0-3.6) 


 


 





 


Creatine Kinase MB Relative


Index 0.4 % (0-4) 


 


 


 





 


Troponin I Quantitative


 < 0.017 ng/mL


(0.000-0.055) 


 


 





 


Total Protein


 7.9 g/dL


(6.4-8.2) 


 


 7.4 g/dL


(6.4-8.2)


 


Albumin


 3.3 g/dL


(3.4-5.0) 


 


 3.0 g/dL


(3.4-5.0)


 


Albumin/Globulin Ratio 0.7 (1.0-1.7)    0.7 (1.0-1.7) 


 


Lipase


 191 U/L


() 


 


 





 


Acetone Level Neg (NEG)    


 


Glucose (Fingerstick)


 


 


 136 mg/dL


(70-99) 





 


Test


 6/12/20


10:59 


 


 





 


Glucose (Fingerstick)


 195 mg/dL


(70-99) 


 


 











Laboratory Tests








Test


 6/11/20


16:50 6/11/20


21:15 6/12/20


07:19 6/12/20


10:30


 


White Blood Count


 15.7 x10^3/uL


(4.0-11.0) 


 


 9.5 x10^3/uL


(4.0-11.0)


 


Red Blood Count


 4.33 x10^6/uL


(3.50-5.40) 


 


 4.20 x10^6/uL


(3.50-5.40)


 


Hemoglobin


 12.4 g/dL


(12.0-15.5) 


 


 11.8 g/dL


(12.0-15.5)


 


Hematocrit


 36.2 %


(36.0-47.0) 


 


 35.1 %


(36.0-47.0)


 


Mean Corpuscular Volume 84 fL ()    83 fL () 


 


Mean Corpuscular Hemoglobin 29 pg (25-35)    28 pg (25-35) 


 


Mean Corpuscular Hemoglobin


Concent 34 g/dL


(31-37) 


 


 34 g/dL


(31-37)


 


Red Cell Distribution Width


 15.0 %


(11.5-14.5) 


 


 15.0 %


(11.5-14.5)


 


Platelet Count


 400 x10^3/uL


(140-400) 


 


 344 x10^3/uL


(140-400)


 


Neutrophils (%) (Auto) 76 % (31-73)    67 % (31-73) 


 


Lymphocytes (%) (Auto) 13 % (24-48)    20 % (24-48) 


 


Monocytes (%) (Auto) 10 % (0-9)    12 % (0-9) 


 


Eosinophils (%) (Auto) 0 % (0-3)    1 % (0-3) 


 


Basophils (%) (Auto) 1 % (0-3)    1 % (0-3) 


 


Neutrophils # (Auto)


 12.0 x10^3/uL


(1.8-7.7) 


 


 6.3 x10^3/uL


(1.8-7.7)


 


Lymphocytes # (Auto)


 2.1 x10^3/uL


(1.0-4.8) 


 


 1.9 x10^3/uL


(1.0-4.8)


 


Monocytes # (Auto)


 1.5 x10^3/uL


(0.0-1.1) 


 


 1.1 x10^3/uL


(0.0-1.1)


 


Eosinophils # (Auto)


 0.0 x10^3/uL


(0.0-0.7) 


 


 0.1 x10^3/uL


(0.0-0.7)


 


Basophils # (Auto)


 0.1 x10^3/uL


(0.0-0.2) 


 


 0.1 x10^3/uL


(0.0-0.2)


 


Prothrombin Time


 14.0 SEC


(11.7-14.0) 


 


 





 


Prothromb Time International


Ratio 1.1 (0.8-1.1) 


 


 


 





 


Activated Partial


Thromboplast Time 30 SEC (24-38) 


 


 


 





 


Urine Collection Type U cath    


 


Urine Color Tatiana    


 


Urine Clarity Cloudy    


 


Urine pH 6.0 (<5.0-8.0)    


 


Urine Specific Gravity


 1.020


(1.000-1.030) 


 


 





 


Urine Protein


 100 mg/dL


(NEG-TRACE) 


 


 





 


Urine Glucose (UA)


 Negative mg/dL


(NEG) 


 


 





 


Urine Ketones (Stick)


 Negative mg/dL


(NEG) 


 


 





 


Urine Blood Large (NEG)    


 


Urine Nitrite Negative (NEG)    


 


Urine Bilirubin Small (NEG)    


 


Urine Urobilinogen Dipstick


 0.2 mg/dL (0.2


mg/dL) 


 


 





 


Urine Leukocyte Esterase Large (NEG)    


 


Urine RBC


 20-40 /HPF


(0-2) 


 


 





 


Urine WBC >40 /HPF (0-4)    


 


Urine Squamous Epithelial


Cells Mod /LPF 


 


 


 





 


Urine Bacteria


 Many /HPF


(0-FEW) 


 


 





 


Sodium Level


 134 mmol/L


(136-145) 


 


 136 mmol/L


(136-145)


 


Potassium Level


 4.9 mmol/L


(3.5-5.1) 


 


 4.2 mmol/L


(3.5-5.1)


 


Chloride Level


 95 mmol/L


() 


 


 97 mmol/L


()


 


Carbon Dioxide Level


 25 mmol/L


(21-32) 


 


 26 mmol/L


(21-32)


 


Anion Gap 14 (6-14)    13 (6-14) 


 


Blood Urea Nitrogen


 72 mg/dL


(7-20) 


 


 72 mg/dL


(7-20)


 


Creatinine


 4.5 mg/dL


(0.6-1.0) 


 


 3.2 mg/dL


(0.6-1.0)


 


Estimated GFR


(Cockcroft-Gault) 9.8 


 


 


 14.5 





 


BUN/Creatinine Ratio 16 (6-20)    23 (6-20) 


 


Glucose Level


 187 mg/dL


(70-99) 


 


 221 mg/dL


(70-99)


 


Lactic Acid Level


 1.7 mmol/L


(0.4-2.0) 1.0 mmol/L


(0.4-2.0) 


 





 


Calcium Level


 9.1 mg/dL


(8.5-10.1) 


 


 8.7 mg/dL


(8.5-10.1)


 


Magnesium Level


 1.5 mg/dL


(1.8-2.4) 


 


 





 


Total Bilirubin


 0.8 mg/dL


(0.2-1.0) 


 


 0.4 mg/dL


(0.2-1.0)


 


Aspartate Amino Transf


(AST/SGOT) 27 U/L (15-37) 


 


 


 25 U/L (15-37) 





 


Alanine Aminotransferase


(ALT/SGPT) 19 U/L (14-59) 


 


 


 20 U/L (14-59) 





 


Alkaline Phosphatase


 85 U/L


() 


 


 84 U/L


()


 


Creatine Kinase


 522 U/L


() 


 


 





 


Creatine Kinase MB (Mass)


 2.0 ng/mL


(0.0-3.6) 


 


 





 


Creatine Kinase MB Relative


Index 0.4 % (0-4) 


 


 


 





 


Troponin I Quantitative


 < 0.017 ng/mL


(0.000-0.055) 


 


 





 


Total Protein


 7.9 g/dL


(6.4-8.2) 


 


 7.4 g/dL


(6.4-8.2)


 


Albumin


 3.3 g/dL


(3.4-5.0) 


 


 3.0 g/dL


(3.4-5.0)


 


Albumin/Globulin Ratio 0.7 (1.0-1.7)    0.7 (1.0-1.7) 


 


Lipase


 191 U/L


() 


 


 





 


Acetone Level Neg (NEG)    


 


Glucose (Fingerstick)


 


 


 136 mg/dL


(70-99) 





 


Test


 6/12/20


10:59 


 


 





 


Glucose (Fingerstick)


 195 mg/dL


(70-99) 


 


 














Assessment/Plan


Assessment/Plan


IMP





LULU WITH CR OF 4.5-NO CKD


EXTRACELLULAR VOLUME DEPLETION


UTI/PYELONEPHRITIS


MORBID OBESITY


DM II


HTN





PLAN





AVOID NEPHROTOXINS


HYDRATION


ANTIBIOTICS


WILL FOLLOW











PENG KIMBALL MD                 Jun 12, 2020 11:13

## 2020-06-12 NOTE — NUR
Patient had Lantus 65 units scheduled at 2100. Patients [id number] and didnt receive Lantus 
last night. Patient states she typically will hold her Lantus if blood sugar is below 150. 
Holding tonights dose and will notify physician.

## 2020-06-12 NOTE — PDOC2
CONSULT


Date of Consult


Date of Consult


DATE: 20 


TIME: 14:23





Reason for Consult


Reason for Consult:


9 x 9 cm right pelvic mass.





Referring Physician


Referring Physician:


Dr. Bobby Burgos





Identification/Chief Complaint


Chief Complaint


9 x 9 cm right pelvic mass.





Source


Source:  Chart review, Patient





History of Present Illness


Reason for Visit:


Mrs. Navya Lyon is a 65 year-old female who presented to [facility] ER on 2020 with complaints of 3-day history of right flank and right 

abdominal pain, sweatiness, and shortness of breath. She also reported loss of

appetite and dysuria. She denied fever, chills, chest pain. CT chest, abdomen, 

pelvis was performed. It showed 9.3 x 9.2 x 6.9 cm mass-like structure at the 

lower abdomen extending into the upper pelvis, hepatosplenomegaly. Her labs 

showed WBC 15.7, Hgb 12.4, Plt 400,  creatinine of 4.5, UA showed findings of 

significant infection. She was admitted for further management. 





Navya is being seen in consultation at the request of Dr. Bobby Burgos. 





Today, Navya is being seen via [employer] platform. She has a history of

MARCIA/BSO. 





Her sister was diagnosed with primary peritoneal cancer at age 61,  at 

age 62. She reports her sister underwent BRCA testing and it was reportedly 

negative.





Past Medical History


Past Medical History


CHF, COPD, diabetes, hypertension, hyperlipidemia, obesity.


Infectious disease:  No pertinent hx


Renal/:  No pertinent hx





Past Surgical History


Past Surgical History


Cholecystectomy, hysterectomy, tubal ligation, bladder cyst, right leg surgery.





Family History


Family History:  Cancer (Sister diagnosed with primary peritoneal cancer at age 

61.), Hypertension





Social History


No


ALCOHOL:  none


Drugs:  None





Current Problem List


Problem List


Problems


Medical Problems:


(1) Abdominal mass


Status: Acute  





(2) Hypomagnesemia


Status: Acute  





(3) Pyelonephritis


Status: Acute  





(4) Renal failure


Status: Acute  











Current Medications


Current Medications





Current Medications


Ondansetron HCl (Zofran) 4 mg 1X  ONCE IVP  Last administered on 20at 

16:54;  Start 20 at 16:15;  Stop 20 at 16:22;  Status DC


Famotidine (Pepcid Vial) 20 mg 1X  ONCE IVP  Last administered on 20at 

16:55;  Start 20 at 16:15;  Stop 20 at 16:22;  Status DC


Sodium Chloride 1,000 ml @  1,000 mls/hr 1X  ONCE IV  Last administered on 

20at 16:53;  Start 20 at 16:15;  Stop 20 at 17:14;  Status DC


Ceftriaxone Sodium (Rocephin) 1 gm 1X  ONCE IVP  Last administered on 20at 

18:24;  Start 20 at 18:00;  Stop 20 at 18:01;  Status DC


Magnesium Sulfate 50 ml @ 25 mls/hr 1X  ONCE IV  Last administered on 20at 

18:31;  Start 20 at 18:15;  Stop 20 at 20:14;  Status DC


Sodium Chloride 1,000 ml @  100 mls/hr Q10H IV  Last administered on 20at 

14:03;  Start 20 at 12:00


Ceftriaxone Sodium (Rocephin) 1 gm Q24H IVP  Last administered on 20at 

14:03;  Start 20 at 12:00


Pantoprazole Sodium (Protonix) 40 mg DAILYAC PO  Last administered on  

14:03;  Start 20 at 12:00


Aspirin (Aspirin Chewable) 81 mg DAILY PO  Last administered on 20at 14:04;

 Start 20 at 13:00


Buspirone HCl (Buspar) 10 mg PRN Q8HRS  PRN PO ANXIETY / AGITATION;  Start 

20 at 12:15


Furosemide (Lasix) 40 mg DAILY PO  Last administered on 20at 14:04;  Start 

20 at 13:00


Levothyroxine Sodium (Synthroid) 88 mcg DAILY06 PO  Last administered on 

20at 14:04;  Start 20 at 13:00


Metformin HCl (Glucophage) 850 mg BIDWMEALS PO ;  Start 20 at 17:00;  

Status UNV


Potassium Chloride (Klor-Con) 20 meq TIDWMEALS PO  Last administered on 

20at 14:04;  Start 20 at 13:00


Tizanidine HCl (Zanaflex) 4 mg QHS PO ;  Start 20 at 21:00


Citalopram Hydrobromide (CeleXA) 20 mg DAILY PO  Last administered on 20at 

14:04;  Start 20 at 13:00


Insulin Glargine (Lantus Syringe) 65 unit QHS SQ ;  Start 20 at 21:00


Insulin Human Lispro (HumaLOG) 25 units TIDWMEALS SQ ;  Start 20 at 17:00


Non-Formulary Medication (Lisinopril/ Hydrochlorothiazide (Lisinopril-Hctz 20-25

Mg Tab)) 1 tab DAILY PO ;  Start 20 at 09:00;  Status UNV


Atorvastatin Calcium (Lipitor) 10 mg QHS PO ;  Start 20 at 21:00


Non-Formulary Medication (Meloxicam ) 1 tab DAILY PO ;  Start 20 at 09:00; 

Status UNV


Repaglinide (Prandin) 1 mg TIDAC PO ;  Start 20 at 16:30





Active Scripts


Active


Reported


Trelegy Ellipta 100-62.5-25 (Fluticasone/Umeclidin/Vilanter) 1 Each Blst.w.dev 1

Each IH PRN


Proair Hfa Inhaler (Albuterol Sulfate) 8.5 Gm Hfa.aer.ad 2 Puff IH PRN Q4-6HRS 

PRN 21 Days


Lisinopril-Hctz 20-25 Mg Tab (Lisinopril/Hydrochlorothiazide) 1 Each Tablet 1 

Tab PO DAILY


Aspirin 81 Mg Tab.chew 1 Tab PO DAILY


Omeprazole 20 Mg Capsule.dr 1 Cap PO DAILY


Lexapro (Escitalopram Oxalate) 20 Mg Tablet 1 Tab PO DAILY


Meloxicam 15 Mg Tablet 1 Tab PO DAILY 30 Days


Lovastatin 40 Mg Tablet 1 Tab PO DAILY


Tizanidine Hcl 4 Mg Tablet 1 Tab PO QHS


Potassium Chloride ** (Potassium Chloride) 20 Meq Tablet.er 20 Meq PO TID


Nateglinide 120 Mg Tablet 120 Mg PO TIDAC


Metformin Hcl 850 Mg Tablet 850 Mg PO BIDWMEALS


Humalog (Insulin Lispro) 100 Unit/1 Ml Cartridge 25 Unit SQ TIDWMEALS


Levothyroxine Sodium 88 Mcg Tablet 1 Tab PO DAILY


Buspirone Hcl 10 Mg Tablet 1 Tab PO PRN Q8HRS PRN


Furosemide 40 Mg Tablet 1 Tab PO DAILY





Allergies


Allergies:  


Coded Allergies:  


     sitagliptin (Verified  Adverse Reaction, Intermediate, HAIR LOSS, 18)





Physical Exam


Physical Exam


Older female laying comfortably in the hospital bed. Appearing in no acute 

distress.


General:  Alert, Oriented X3


HEENT:  Atraumatic


Heart:  Regular rate


Abdomen:  Normal bowel sounds, Other (Obese)


Skin:  No rashes


Psych/Mental Status:  Mental status NL





Vitals


VITALS





Vital Signs








  Date Time  Temp Pulse Resp B/P (MAP) Pulse Ox O2 Delivery O2 Flow Rate FiO2


 


20 11:22 98.0 91 20 112/41 (64) 99 Nasal Cannula 3.0 





 98.0       











Labs


Labs





Laboratory Tests








Test


 20


16:50 20


21:15 20


07:19 20


10:30


 


White Blood Count


 15.7 x10^3/uL


(4.0-11.0) 


 


 9.5 x10^3/uL


(4.0-11.0)


 


Red Blood Count


 4.33 x10^6/uL


(3.50-5.40) 


 


 4.20 x10^6/uL


(3.50-5.40)


 


Hemoglobin


 12.4 g/dL


(12.0-15.5) 


 


 11.8 g/dL


(12.0-15.5)


 


Hematocrit


 36.2 %


(36.0-47.0) 


 


 35.1 %


(36.0-47.0)


 


Mean Corpuscular Volume 84 fL ()    83 fL () 


 


Mean Corpuscular Hemoglobin 29 pg (25-35)    28 pg (25-35) 


 


Mean Corpuscular Hemoglobin


Concent 34 g/dL


(31-37) 


 


 34 g/dL


(31-37)


 


Red Cell Distribution Width


 15.0 %


(11.5-14.5) 


 


 15.0 %


(11.5-14.5)


 


Platelet Count


 400 x10^3/uL


(140-400) 


 


 344 x10^3/uL


(140-400)


 


Neutrophils (%) (Auto) 76 % (31-73)    67 % (31-73) 


 


Lymphocytes (%) (Auto) 13 % (24-48)    20 % (24-48) 


 


Monocytes (%) (Auto) 10 % (0-9)    12 % (0-9) 


 


Eosinophils (%) (Auto) 0 % (0-3)    1 % (0-3) 


 


Basophils (%) (Auto) 1 % (0-3)    1 % (0-3) 


 


Neutrophils # (Auto)


 12.0 x10^3/uL


(1.8-7.7) 


 


 6.3 x10^3/uL


(1.8-7.7)


 


Lymphocytes # (Auto)


 2.1 x10^3/uL


(1.0-4.8) 


 


 1.9 x10^3/uL


(1.0-4.8)


 


Monocytes # (Auto)


 1.5 x10^3/uL


(0.0-1.1) 


 


 1.1 x10^3/uL


(0.0-1.1)


 


Eosinophils # (Auto)


 0.0 x10^3/uL


(0.0-0.7) 


 


 0.1 x10^3/uL


(0.0-0.7)


 


Basophils # (Auto)


 0.1 x10^3/uL


(0.0-0.2) 


 


 0.1 x10^3/uL


(0.0-0.2)


 


Prothrombin Time


 14.0 SEC


(11.7-14.0) 


 


 





 


Prothromb Time International


Ratio 1.1 (0.8-1.1) 


 


 


 





 


Activated Partial


Thromboplast Time 30 SEC (24-38) 


 


 


 





 


Urine Collection Type U cath    


 


Urine Color Tatiana    


 


Urine Clarity Cloudy    


 


Urine pH 6.0 (<5.0-8.0)    


 


Urine Specific Gravity


 1.020


(1.000-1.030) 


 


 





 


Urine Protein


 100 mg/dL


(NEG-TRACE) 


 


 





 


Urine Glucose (UA)


 Negative mg/dL


(NEG) 


 


 





 


Urine Ketones (Stick)


 Negative mg/dL


(NEG) 


 


 





 


Urine Blood Large (NEG)    


 


Urine Nitrite Negative (NEG)    


 


Urine Bilirubin Small (NEG)    


 


Urine Urobilinogen Dipstick


 0.2 mg/dL (0.2


mg/dL) 


 


 





 


Urine Leukocyte Esterase Large (NEG)    


 


Urine RBC


 20-40 /HPF


(0-2) 


 


 





 


Urine WBC >40 /HPF (0-4)    


 


Urine Squamous Epithelial


Cells Mod /LPF 


 


 


 





 


Urine Bacteria


 Many /HPF


(0-FEW) 


 


 





 


Sodium Level


 134 mmol/L


(136-145) 


 


 136 mmol/L


(136-145)


 


Potassium Level


 4.9 mmol/L


(3.5-5.1) 


 


 4.2 mmol/L


(3.5-5.1)


 


Chloride Level


 95 mmol/L


() 


 


 97 mmol/L


()


 


Carbon Dioxide Level


 25 mmol/L


(21-32) 


 


 26 mmol/L


(21-32)


 


Anion Gap 14 (6-14)    13 (6-14) 


 


Blood Urea Nitrogen


 72 mg/dL


(7-20) 


 


 72 mg/dL


(7-20)


 


Creatinine


 4.5 mg/dL


(0.6-1.0) 


 


 3.2 mg/dL


(0.6-1.0)


 


Estimated GFR


(Cockcroft-Gault) 9.8 


 


 


 14.5 





 


BUN/Creatinine Ratio 16 (6-20)    23 (6-20) 


 


Glucose Level


 187 mg/dL


(70-99) 


 


 221 mg/dL


(70-99)


 


Lactic Acid Level


 1.7 mmol/L


(0.4-2.0) 1.0 mmol/L


(0.4-2.0) 


 





 


Calcium Level


 9.1 mg/dL


(8.5-10.1) 


 


 8.7 mg/dL


(8.5-10.1)


 


Magnesium Level


 1.5 mg/dL


(1.8-2.4) 


 


 





 


Total Bilirubin


 0.8 mg/dL


(0.2-1.0) 


 


 0.4 mg/dL


(0.2-1.0)


 


Aspartate Amino Transf


(AST/SGOT) 27 U/L (15-37) 


 


 


 25 U/L (15-37) 





 


Alanine Aminotransferase


(ALT/SGPT) 19 U/L (14-59) 


 


 


 20 U/L (14-59) 





 


Alkaline Phosphatase


 85 U/L


() 


 


 84 U/L


()


 


Creatine Kinase


 522 U/L


() 


 


 





 


Creatine Kinase MB (Mass)


 2.0 ng/mL


(0.0-3.6) 


 


 





 


Creatine Kinase MB Relative


Index 0.4 % (0-4) 


 


 


 





 


Troponin I Quantitative


 < 0.017 ng/mL


(0.000-0.055) 


 


 





 


Total Protein


 7.9 g/dL


(6.4-8.2) 


 


 7.4 g/dL


(6.4-8.2)


 


Albumin


 3.3 g/dL


(3.4-5.0) 


 


 3.0 g/dL


(3.4-5.0)


 


Albumin/Globulin Ratio 0.7 (1.0-1.7)    0.7 (1.0-1.7) 


 


Lipase


 191 U/L


() 


 


 





 


Acetone Level Neg (NEG)    


 


Glucose (Fingerstick)


 


 


 136 mg/dL


(70-99) 





 


Test


 20


10:59 20


12:05 


 





 


Glucose (Fingerstick)


 195 mg/dL


(70-99) 


 


 





 


Iron Level


 


 31 ug/dL


() 


 





 


Total Iron Binding Capacity


 


 218 ug/dL


(250-450) 


 





 


Iron Saturation  14 % (15-34)   


 


Vitamin B12 Level


 


 340 pg/mL


(247-911) 


 











Laboratory Tests








Test


 20


16:50 20


21:15 20


07:19 20


10:30


 


White Blood Count


 15.7 x10^3/uL


(4.0-11.0) 


 


 9.5 x10^3/uL


(4.0-11.0)


 


Red Blood Count


 4.33 x10^6/uL


(3.50-5.40) 


 


 4.20 x10^6/uL


(3.50-5.40)


 


Hemoglobin


 12.4 g/dL


(12.0-15.5) 


 


 11.8 g/dL


(12.0-15.5)


 


Hematocrit


 36.2 %


(36.0-47.0) 


 


 35.1 %


(36.0-47.0)


 


Mean Corpuscular Volume 84 fL ()    83 fL () 


 


Mean Corpuscular Hemoglobin 29 pg (25-35)    28 pg (25-35) 


 


Mean Corpuscular Hemoglobin


Concent 34 g/dL


(31-37) 


 


 34 g/dL


(31-37)


 


Red Cell Distribution Width


 15.0 %


(11.5-14.5) 


 


 15.0 %


(11.5-14.5)


 


Platelet Count


 400 x10^3/uL


(140-400) 


 


 344 x10^3/uL


(140-400)


 


Neutrophils (%) (Auto) 76 % (31-73)    67 % (31-73) 


 


Lymphocytes (%) (Auto) 13 % (24-48)    20 % (24-48) 


 


Monocytes (%) (Auto) 10 % (0-9)    12 % (0-9) 


 


Eosinophils (%) (Auto) 0 % (0-3)    1 % (0-3) 


 


Basophils (%) (Auto) 1 % (0-3)    1 % (0-3) 


 


Neutrophils # (Auto)


 12.0 x10^3/uL


(1.8-7.7) 


 


 6.3 x10^3/uL


(1.8-7.7)


 


Lymphocytes # (Auto)


 2.1 x10^3/uL


(1.0-4.8) 


 


 1.9 x10^3/uL


(1.0-4.8)


 


Monocytes # (Auto)


 1.5 x10^3/uL


(0.0-1.1) 


 


 1.1 x10^3/uL


(0.0-1.1)


 


Eosinophils # (Auto)


 0.0 x10^3/uL


(0.0-0.7) 


 


 0.1 x10^3/uL


(0.0-0.7)


 


Basophils # (Auto)


 0.1 x10^3/uL


(0.0-0.2) 


 


 0.1 x10^3/uL


(0.0-0.2)


 


Prothrombin Time


 14.0 SEC


(11.7-14.0) 


 


 





 


Prothromb Time International


Ratio 1.1 (0.8-1.1) 


 


 


 





 


Activated Partial


Thromboplast Time 30 SEC (24-38) 


 


 


 





 


Urine Collection Type U cath    


 


Urine Color Tatiana    


 


Urine Clarity Cloudy    


 


Urine pH 6.0 (<5.0-8.0)    


 


Urine Specific Gravity


 1.020


(1.000-1.030) 


 


 





 


Urine Protein


 100 mg/dL


(NEG-TRACE) 


 


 





 


Urine Glucose (UA)


 Negative mg/dL


(NEG) 


 


 





 


Urine Ketones (Stick)


 Negative mg/dL


(NEG) 


 


 





 


Urine Blood Large (NEG)    


 


Urine Nitrite Negative (NEG)    


 


Urine Bilirubin Small (NEG)    


 


Urine Urobilinogen Dipstick


 0.2 mg/dL (0.2


mg/dL) 


 


 





 


Urine Leukocyte Esterase Large (NEG)    


 


Urine RBC


 20-40 /HPF


(0-2) 


 


 





 


Urine WBC >40 /HPF (0-4)    


 


Urine Squamous Epithelial


Cells Mod /LPF 


 


 


 





 


Urine Bacteria


 Many /HPF


(0-FEW) 


 


 





 


Sodium Level


 134 mmol/L


(136-145) 


 


 136 mmol/L


(136-145)


 


Potassium Level


 4.9 mmol/L


(3.5-5.1) 


 


 4.2 mmol/L


(3.5-5.1)


 


Chloride Level


 95 mmol/L


() 


 


 97 mmol/L


()


 


Carbon Dioxide Level


 25 mmol/L


(21-32) 


 


 26 mmol/L


(21-32)


 


Anion Gap 14 (6-14)    13 (6-14) 


 


Blood Urea Nitrogen


 72 mg/dL


(7-20) 


 


 72 mg/dL


(7-20)


 


Creatinine


 4.5 mg/dL


(0.6-1.0) 


 


 3.2 mg/dL


(0.6-1.0)


 


Estimated GFR


(Cockcroft-Gault) 9.8 


 


 


 14.5 





 


BUN/Creatinine Ratio 16 (6-20)    23 (6-20) 


 


Glucose Level


 187 mg/dL


(70-99) 


 


 221 mg/dL


(70-99)


 


Lactic Acid Level


 1.7 mmol/L


(0.4-2.0) 1.0 mmol/L


(0.4-2.0) 


 





 


Calcium Level


 9.1 mg/dL


(8.5-10.1) 


 


 8.7 mg/dL


(8.5-10.1)


 


Magnesium Level


 1.5 mg/dL


(1.8-2.4) 


 


 





 


Total Bilirubin


 0.8 mg/dL


(0.2-1.0) 


 


 0.4 mg/dL


(0.2-1.0)


 


Aspartate Amino Transf


(AST/SGOT) 27 U/L (15-37) 


 


 


 25 U/L (15-37) 





 


Alanine Aminotransferase


(ALT/SGPT) 19 U/L (14-59) 


 


 


 20 U/L (14-59) 





 


Alkaline Phosphatase


 85 U/L


() 


 


 84 U/L


()


 


Creatine Kinase


 522 U/L


() 


 


 





 


Creatine Kinase MB (Mass)


 2.0 ng/mL


(0.0-3.6) 


 


 





 


Creatine Kinase MB Relative


Index 0.4 % (0-4) 


 


 


 





 


Troponin I Quantitative


 < 0.017 ng/mL


(0.000-0.055) 


 


 





 


Total Protein


 7.9 g/dL


(6.4-8.2) 


 


 7.4 g/dL


(6.4-8.2)


 


Albumin


 3.3 g/dL


(3.4-5.0) 


 


 3.0 g/dL


(3.4-5.0)


 


Albumin/Globulin Ratio 0.7 (1.0-1.7)    0.7 (1.0-1.7) 


 


Lipase


 191 U/L


() 


 


 





 


Acetone Level Neg (NEG)    


 


Glucose (Fingerstick)


 


 


 136 mg/dL


(70-99) 





 


Test


 20


10:59 20


12:05 


 





 


Glucose (Fingerstick)


 195 mg/dL


(70-99) 


 


 





 


Iron Level


 


 31 ug/dL


() 


 





 


Total Iron Binding Capacity


 


 218 ug/dL


(250-450) 


 





 


Iron Saturation  14 % (15-34)   


 


Vitamin B12 Level


 


 340 pg/mL


(247-911) 


 














Images


Images





2020 Pelvic ultrasound limited 2020 9:15 AM


 


TECHNIQUE: Limited grayscale, color Doppler images of the pelvis obtained.


 


INDICATION:  10 cm mass in pelvis on CT 


 


COMPARISON:    CT chest abdomen and pelvis 2020.


 


The exam is very limited due to body habitus. There is a hypoechoic mass in the 

pelvis measuring at least 9 x 8 x 6 cm. There is some through 


transmission suggesting that this may be at least partially cystic. The mass is 

not well characterized or entirely visualized on this exam due to 


the size of the mass and do to patient body habitus. Evaluation for blood flow 

limited. Uterus and ovaries are not definitively visualized.


 


IMPRESSION: Hypoechoic mass in the pelvis measuring at least 9 cm in diameter. 

This has some through transmission and may be at least partially


cystic but is suboptimal evaluated by ultrasound due to the large size of the 

mass and patient body habitus. Recommend MRI to further evaluate.


 


2020 STUDY: CT chest, abdomen and pelvis without contrast


 


INDICATION: Shortness of air. Right flank pain.


 


COMPARISON: None available.


 


CHEST:


 


Degraded study due to body habitus. Portions of the body wall soft tissues are 

excluded from the field-of-view.


 


Calcific coronary artery disease. Nonaneurysmal aorta. Mitral annular 

calcifications.


 


No pericardial effusion or ileus tunnel/hilar lymphadenopathy.


 


Left upper lobe calcified granuloma. No suspicious nodule or infiltrate. 


Mild right more so than left basilar volume loss.


 


No acute abnormality of the body wall soft tissues included in the fie

ld-of-view. Scattered degenerative osseous findings.


 


Abdomen/pelvis:


 


Portions of the body wall as well as a small portion of the right abdomen are 

excluded from the field-of-view. Inherently limited study without 


intravenous contrast. No focal hepatic parenchymal abnormality is identified. 

The liver is prominent in size. Status post cholecystectomy. 


Unremarkable pancreas. Mild splenomegaly. No adrenal gland mass. 


 


Bilateral perinephric fat stranding slightly more pronounced on the right. 

Somewhat linear intrarenal stone on image 43 series 6 measuring 7 mm AP. 


No hydroureteronephrosis. Mostly decompressed urinary bladder. No uterus is 

seen. Radiodensities at the left adnexa could be related to surgical 


resection of the left ovary. No normal ovary is seen on the right. At the lower 

aspect of the abdomen/upper pelvis is a lobulated mass with an 


internal density of approximately 40 Hounsfield units that measures 

approximately 9.3 x 9.2 x 6.9 cm. The right ureter appears to be displaced


medially by this mass but not obstructed.


 


No focal abnormality of the colon. Some incompletely formed stool is seen within

the more proximal colon. The appendix is not well delineated. 


Nonobstructed small bowel. Limited assessment of the stomach as well as the rest

of the bowel without oral contrast.


 


Mild scattered calcific atherosclerosis. Nonaneurysmal aorta.


 


Scattered degenerative osseous findings with greatest involvement of the lower 

lumbar facet joints. Grade 1 anterolisthesis of L4 on L5. Osseous neural goldy

inal encroachment on the right at L4-L5.


 


IMPRESSION:


 


CHEST:


1. Degraded study due to body habitus. Portions of the chest wall are outside 

the field-of-view.


2. No acute abnormality seen throughout the chest.


3. Chronic findings to include calcific coronary artery disease.


 


Abdomen/pelvis:


1. Portions of the body wall and a small portion of the right abdomen are 

excluded from the field-of-view.


2. Mild right more so than left perinephric fat stranding but no obstructing 

stone is seen. There is a 7 mm somewhat linear intrarenal 


stone on the right. In the setting of perinephric fat stranding consider 

correlation with urinalysis to exclude a urinary tract infection.


3. Lobulated mass-like structure at the lower abdomen extending into the upper 

pelvis measuring 9.3 x 9.2 x 6.9 cm. No uterus is seen and the left 


ovary may be surgically absent. No normal right ovary is identified and it is 

possible that this mass is ovarian in origin. The internal density is 


approximately 40 Hounsfield units confirming that this is not a simple ovarian 

cyst or simple postoperative fluid collection. Sonography would be useful to 

help assess for malignant features but could be limited due to patient body 

habitus. If renal function permits the use of contrast, eventual CT or MRI of 

this region could be performed. If the patient cannot receive contrast and 

ultrasound is unrevealing, unenhanced MRI would be the next best study.


4. Hepatosplenomegaly.





Assessment/Plan


Assessment/Plan


65 year-old female with family history of primary peritoneal cancer (sister 

diagnosed at 61,  at 62), reportedly BRCA negative, admitted with 

pyelonephritis/LULU. 





2020 CT showed incidental finding of 9 x 9 cm right pelvic mass with at 

least some cystic component. 





The patient will require further evaluation with  (ordered by Dr. Burgos) 

and CEA (ordered). 





Consultation with gynecologic oncologist will be scheduled as outpatient (the 

patient would like to see former gynecologic oncologist of her sister, Dr. Mitul White). 





Navya would prefer to receive adjuvant therapy closer to her home, at [facility], after gyn onc evaluation and possible gynecologic surgery/removal

of the pelvic mass. 





Plan: 


1.  and CEA, pending. 


2. Gyn onc consult with Dr. Mitul White as outpatient. 


3. Follow-up at [facility] in 3 weeks.





Thank you for the opportunity to see your patient. Please call with any 

questions. 





Narayan Sanford MD 


[phone]











LUC SANFORD MD            2020 14:51

## 2020-06-12 NOTE — RAD
EXAMINATION: Pelvic ultrasound limited 6/12/2020 9:15 AM

 

TECHNIQUE: Limited grayscale, color Doppler images of the pelvis obtained.

 

INDICATION:  10 cm mass in pelvis on CT 

 

COMPARISON:    CT chest abdomen and pelvis 6/11/2020.

 

FINDINGS:

 

The exam is very limited due to body habitus. There is a hypoechoic mass 

in the pelvis measuring at least 9 x 8 x 6 cm. There is some through 

transmission suggesting that this may be at least partially cystic. The 

mass is not well characterized or entirely visualized on this exam due to 

the size of the mass and do to patient body habitus. Evaluation for blood 

flow limited. Uterus and ovaries are not definitively visualized.

 

IMPRESSION: Hypoechoic mass in the pelvis measuring at least 9 cm in 

diameter. This has some through transmission and may be at least partially

cystic but is suboptimal evaluated by ultrasound due to the large size of 

the mass and patient body habitus. Recommend MRI to further evaluate.

 

Electronically signed by: Samira Salas MD (6/12/2020 10:37 AM) 

[id number]

## 2020-06-12 NOTE — NUR
SW following for discharge planning. SW reviewed chart and spoke with RN. SW met with pt who 
stated she lives with her daughter and son-in-law and their two children. Pt stated she has 
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 Pt stated she 
has 24 hour care available to her from family. Pt stated she plans to return home at 
discharge. Pt on 3l of 02 and stated she has 02 at home. Pt on IV Rocephin. Nephrology 
consulted per renal failure. SW to follow.

## 2020-06-12 NOTE — PDOC2
CONSULT


Date of Consult


Date of Consult


DATE: 6/12/20 


TIME: 15:23





Reason for Consult


Reason for Consult:


pelvic mass





Referring Physician


Referring Physician:


Dr. Burgos





Identification/Chief Complaint


Chief Complaint


SOB and fatigue





Source


Source:  Chart review, Patient





History of Present Illness


Reason for Visit:


66 y/o presented with SOB and fatigue.  She was found to have kidney failure and

pelvic mass per CT scan.  Pelvic sono reviewed as well indicated 10 cm pelvic 

mass with cystic components.  She denies any family h/o ovarian or breast 

cancer.  She reports hysterectomy but unsure if ovaries were removed.





Past Medical History


Cardiovascular:  CHF, HTN


Pulmonary:  COPD


Infectious disease:  No pertinent hx


Renal/:  Chronic renal failure


Endocrine:  Diabetes





Past Surgical History


Past Surgical History:  Cholecystectomy, Tubal Ligation, Hysterectomy





Family History


Family History:  Cancer (Sister diagnosed with primary peritoneal cancer at age 

61.), Hypertension





Social History


No


ALCOHOL:  none


Drugs:  None





Current Problem List


Problem List


Problems


Medical Problems:


(1) Abdominal mass


Status: Acute  





(2) Hypomagnesemia


Status: Acute  





(3) Pyelonephritis


Status: Acute  





(4) Renal failure


Status: Acute  











Current Medications


Current Medications





Current Medications


Ondansetron HCl (Zofran) 4 mg 1X  ONCE IVP  Last administered on 6/11/20at 

16:54;  Start 6/11/20 at 16:15;  Stop 6/11/20 at 16:22;  Status DC


Famotidine (Pepcid Vial) 20 mg 1X  ONCE IVP  Last administered on 6/11/20at 

16:55;  Start 6/11/20 at 16:15;  Stop 6/11/20 at 16:22;  Status DC


Sodium Chloride 1,000 ml @  1,000 mls/hr 1X  ONCE IV  Last administered on 

6/11/20at 16:53;  Start 6/11/20 at 16:15;  Stop 6/11/20 at 17:14;  Status DC


Ceftriaxone Sodium (Rocephin) 1 gm 1X  ONCE IVP  Last administered on 6/11/20at 

18:24;  Start 6/11/20 at 18:00;  Stop 6/11/20 at 18:01;  Status DC


Magnesium Sulfate 50 ml @ 25 mls/hr 1X  ONCE IV  Last administered on 6/11/20at 

18:31;  Start 6/11/20 at 18:15;  Stop 6/11/20 at 20:14;  Status DC


Sodium Chloride 1,000 ml @  100 mls/hr Q10H IV  Last administered on 6/12/20at 

14:03;  Start 6/12/20 at 12:00


Ceftriaxone Sodium (Rocephin) 1 gm Q24H IVP  Last administered on 6/12/20at 

14:03;  Start 6/12/20 at 12:00


Pantoprazole Sodium (Protonix) 40 mg DAILYAC PO  Last administered on 6/12/20at 

14:03;  Start 6/12/20 at 12:00


Aspirin (Aspirin Chewable) 81 mg DAILY PO  Last administered on 6/12/20at 14:04;

 Start 6/12/20 at 13:00


Buspirone HCl (Buspar) 10 mg PRN Q8HRS  PRN PO ANXIETY / AGITATION;  Start 

6/12/20 at 12:15


Furosemide (Lasix) 40 mg DAILY PO  Last administered on 6/12/20at 14:04;  Start 

6/12/20 at 13:00


Levothyroxine Sodium (Synthroid) 88 mcg DAILY06 PO  Last administered on 6/ 12/20at 14:04;  Start 6/12/20 at 13:00


Metformin HCl (Glucophage) 850 mg BIDWMEALS PO ;  Start 6/12/20 at 17:00;  

Status UNV


Potassium Chloride (Klor-Con) 20 meq TIDWMEALS PO  Last administered on 

6/12/20at 14:04;  Start 6/12/20 at 13:00


Tizanidine HCl (Zanaflex) 4 mg QHS PO ;  Start 6/12/20 at 21:00


Citalopram Hydrobromide (CeleXA) 20 mg DAILY PO  Last administered on 6/12/20at 

14:04;  Start 6/12/20 at 13:00


Insulin Glargine (Lantus Syringe) 65 unit QHS SQ ;  Start 6/12/20 at 21:00


Insulin Human Lispro (HumaLOG) 25 units TIDWMEALS SQ ;  Start 6/12/20 at 17:00


Non-Formulary Medication (Lisinopril/ Hydrochlorothiazide (Lisinopril-Hctz 20-25

Mg Tab)) 1 tab DAILY PO ;  Start 6/13/20 at 09:00;  Status UNV


Atorvastatin Calcium (Lipitor) 10 mg QHS PO ;  Start 6/12/20 at 21:00


Non-Formulary Medication (Meloxicam ) 1 tab DAILY PO ;  Start 6/13/20 at 09:00; 

Status UNV


Repaglinide (Prandin) 1 mg TIDAC PO ;  Start 6/12/20 at 16:30





Active Scripts


Active


Reported


Trelegy Ellipta 100-62.5-25 (Fluticasone/Umeclidin/Vilanter) 1 Each Blst.w.dev 1

Each IH PRN


Proair Hfa Inhaler (Albuterol Sulfate) 8.5 Gm Hfa.aer.ad 2 Puff IH PRN Q4-6HRS 

PRN 21 Days


Lisinopril-Hctz 20-25 Mg Tab (Lisinopril/Hydrochlorothiazide) 1 Each Tablet 1 

Tab PO DAILY


Aspirin 81 Mg Tab.chew 1 Tab PO DAILY


Omeprazole 20 Mg Capsule.dr 1 Cap PO DAILY


Lexapro (Escitalopram Oxalate) 20 Mg Tablet 1 Tab PO DAILY


Meloxicam 15 Mg Tablet 1 Tab PO DAILY 30 Days


Lovastatin 40 Mg Tablet 1 Tab PO DAILY


Tizanidine Hcl 4 Mg Tablet 1 Tab PO QHS


Potassium Chloride ** (Potassium Chloride) 20 Meq Tablet.er 20 Meq PO TID


Nateglinide 120 Mg Tablet 120 Mg PO TIDAC


Metformin Hcl 850 Mg Tablet 850 Mg PO BIDWMEALS


Humalog (Insulin Lispro) 100 Unit/1 Ml Cartridge 25 Unit SQ TIDWMEALS


Levothyroxine Sodium 88 Mcg Tablet 1 Tab PO DAILY


Buspirone Hcl 10 Mg Tablet 1 Tab PO PRN Q8HRS PRN


Furosemide 40 Mg Tablet 1 Tab PO DAILY





Allergies


Allergies:  


Coded Allergies:  


     sitagliptin (Verified  Adverse Reaction, Intermediate, HAIR LOSS, 12/26/18)





ROS


General:  YES: Fatigue, Malaise; 


   No: Chills, Night Sweats, Appetite, Other


PSYCHOLOGICAL ROS:  YES: Anxiety; 


   No: Behavioral Disorder, Concentration difficultie, Decreased libido, 

Depression, Disorientation, Hallucinations, Hostility, Irritablity, Memory 

difficulties, Mood Swings, Obsessive thoughts, Physical abuse, Sexual abuse, 

Sleep disturbances, Suicidal ideation, Other


Eyes:  No Blurry vision, No Decreased vision, No Double vision, No Dry eyes, No 

Excessive tearing, No Eye Pain, No Itchy Eyes, No Loss of vision, No 

Photophobia, No Scotomata, No Uses contacts, No Uses glasses, No Other


HEENT:  No: Heacaches, Visual Changes, Hearing change, Nasal congestion, Nasal 

discharge, Oral lesions, Sinus pain, Sore Throat, Epistaxis, Sneezing, Snoring, 

Tinnitus, Vertigo, Vocal changes, Other


ALLERGY AND IMMUNOLOGY:  No: Hives, Insect Bite Sensitivity, Itchy/Watery Eyes, 

Nasal Congestion, Post Nasal Drip, Seasonal Allergies, Other


Hematological and Lymphatic:  No: Bleeding Problems, Blood Clots, Blood 

Transfusions, Brusing, Night Sweats, Pallor, Swollen Lymph Nodes, Other


ENDOCRINE:  No: Breast Changes, Galactorrhea, Hair Pattern Changes, Hot Flashes,

Malaise/lethargy, Mood Swings, Palpitations, Polydipsia/polyuria, Skin Changes, 

Temperature Intolerance, Unexpected Weight Changes, Other


Breast:  No New/Changing Breast Lumps, No Nipple changes, No Nipple discharge, 

No Other


Respiratory:  YES: SOB with excertion


Cardiovascular:  yes Edema; 


   No Chest Pain, No Palpitations, No Orthopnea, No Paroxysmal Noc. Dyspnea, No 

Lt Headedness, No Other


Gastrointestinal:  Yes Nausea, Yes Abdominal Pain





Physical Exam


General:  Alert, Cooperative


HEENT:  Atraumatic


Lungs:  Clear to auscultation


Heart:  Regular rate


Abdomen:  Soft, No tenderness, Other (pelvic deferred.)


Psych/Mental Status:  Mental status NL





Vitals


VITALS





Vital Signs








  Date Time  Temp Pulse Resp B/P (MAP) Pulse Ox O2 Delivery O2 Flow Rate FiO2


 


6/12/20 11:22 98.0 91 20 112/41 (64) 99 Nasal Cannula 3.0 





 98.0       











Labs


Labs





Laboratory Tests








Test


 6/11/20


16:50 6/11/20


21:15 6/12/20


07:19 6/12/20


10:30


 


White Blood Count


 15.7 x10^3/uL


(4.0-11.0) 


 


 9.5 x10^3/uL


(4.0-11.0)


 


Red Blood Count


 4.33 x10^6/uL


(3.50-5.40) 


 


 4.20 x10^6/uL


(3.50-5.40)


 


Hemoglobin


 12.4 g/dL


(12.0-15.5) 


 


 11.8 g/dL


(12.0-15.5)


 


Hematocrit


 36.2 %


(36.0-47.0) 


 


 35.1 %


(36.0-47.0)


 


Mean Corpuscular Volume 84 fL ()    83 fL () 


 


Mean Corpuscular Hemoglobin 29 pg (25-35)    28 pg (25-35) 


 


Mean Corpuscular Hemoglobin


Concent 34 g/dL


(31-37) 


 


 34 g/dL


(31-37)


 


Red Cell Distribution Width


 15.0 %


(11.5-14.5) 


 


 15.0 %


(11.5-14.5)


 


Platelet Count


 400 x10^3/uL


(140-400) 


 


 344 x10^3/uL


(140-400)


 


Neutrophils (%) (Auto) 76 % (31-73)    67 % (31-73) 


 


Lymphocytes (%) (Auto) 13 % (24-48)    20 % (24-48) 


 


Monocytes (%) (Auto) 10 % (0-9)    12 % (0-9) 


 


Eosinophils (%) (Auto) 0 % (0-3)    1 % (0-3) 


 


Basophils (%) (Auto) 1 % (0-3)    1 % (0-3) 


 


Neutrophils # (Auto)


 12.0 x10^3/uL


(1.8-7.7) 


 


 6.3 x10^3/uL


(1.8-7.7)


 


Lymphocytes # (Auto)


 2.1 x10^3/uL


(1.0-4.8) 


 


 1.9 x10^3/uL


(1.0-4.8)


 


Monocytes # (Auto)


 1.5 x10^3/uL


(0.0-1.1) 


 


 1.1 x10^3/uL


(0.0-1.1)


 


Eosinophils # (Auto)


 0.0 x10^3/uL


(0.0-0.7) 


 


 0.1 x10^3/uL


(0.0-0.7)


 


Basophils # (Auto)


 0.1 x10^3/uL


(0.0-0.2) 


 


 0.1 x10^3/uL


(0.0-0.2)


 


Prothrombin Time


 14.0 SEC


(11.7-14.0) 


 


 





 


Prothromb Time International


Ratio 1.1 (0.8-1.1) 


 


 


 





 


Activated Partial


Thromboplast Time 30 SEC (24-38) 


 


 


 





 


Urine Collection Type U cath    


 


Urine Color Tatiana    


 


Urine Clarity Cloudy    


 


Urine pH 6.0 (<5.0-8.0)    


 


Urine Specific Gravity


 1.020


(1.000-1.030) 


 


 





 


Urine Protein


 100 mg/dL


(NEG-TRACE) 


 


 





 


Urine Glucose (UA)


 Negative mg/dL


(NEG) 


 


 





 


Urine Ketones (Stick)


 Negative mg/dL


(NEG) 


 


 





 


Urine Blood Large (NEG)    


 


Urine Nitrite Negative (NEG)    


 


Urine Bilirubin Small (NEG)    


 


Urine Urobilinogen Dipstick


 0.2 mg/dL (0.2


mg/dL) 


 


 





 


Urine Leukocyte Esterase Large (NEG)    


 


Urine RBC


 20-40 /HPF


(0-2) 


 


 





 


Urine WBC >40 /HPF (0-4)    


 


Urine Squamous Epithelial


Cells Mod /LPF 


 


 


 





 


Urine Bacteria


 Many /HPF


(0-FEW) 


 


 





 


Sodium Level


 134 mmol/L


(136-145) 


 


 136 mmol/L


(136-145)


 


Potassium Level


 4.9 mmol/L


(3.5-5.1) 


 


 4.2 mmol/L


(3.5-5.1)


 


Chloride Level


 95 mmol/L


() 


 


 97 mmol/L


()


 


Carbon Dioxide Level


 25 mmol/L


(21-32) 


 


 26 mmol/L


(21-32)


 


Anion Gap 14 (6-14)    13 (6-14) 


 


Blood Urea Nitrogen


 72 mg/dL


(7-20) 


 


 72 mg/dL


(7-20)


 


Creatinine


 4.5 mg/dL


(0.6-1.0) 


 


 3.2 mg/dL


(0.6-1.0)


 


Estimated GFR


(Cockcroft-Gault) 9.8 


 


 


 14.5 





 


BUN/Creatinine Ratio 16 (6-20)    23 (6-20) 


 


Glucose Level


 187 mg/dL


(70-99) 


 


 221 mg/dL


(70-99)


 


Lactic Acid Level


 1.7 mmol/L


(0.4-2.0) 1.0 mmol/L


(0.4-2.0) 


 





 


Calcium Level


 9.1 mg/dL


(8.5-10.1) 


 


 8.7 mg/dL


(8.5-10.1)


 


Magnesium Level


 1.5 mg/dL


(1.8-2.4) 


 


 





 


Total Bilirubin


 0.8 mg/dL


(0.2-1.0) 


 


 0.4 mg/dL


(0.2-1.0)


 


Aspartate Amino Transf


(AST/SGOT) 27 U/L (15-37) 


 


 


 25 U/L (15-37) 





 


Alanine Aminotransferase


(ALT/SGPT) 19 U/L (14-59) 


 


 


 20 U/L (14-59) 





 


Alkaline Phosphatase


 85 U/L


() 


 


 84 U/L


()


 


Creatine Kinase


 522 U/L


() 


 


 





 


Creatine Kinase MB (Mass)


 2.0 ng/mL


(0.0-3.6) 


 


 





 


Creatine Kinase MB Relative


Index 0.4 % (0-4) 


 


 


 





 


Troponin I Quantitative


 < 0.017 ng/mL


(0.000-0.055) 


 


 





 


Total Protein


 7.9 g/dL


(6.4-8.2) 


 


 7.4 g/dL


(6.4-8.2)


 


Albumin


 3.3 g/dL


(3.4-5.0) 


 


 3.0 g/dL


(3.4-5.0)


 


Albumin/Globulin Ratio 0.7 (1.0-1.7)    0.7 (1.0-1.7) 


 


Lipase


 191 U/L


() 


 


 





 


Acetone Level Neg (NEG)    


 


Glucose (Fingerstick)


 


 


 136 mg/dL


(70-99) 





 


Test


 6/12/20


10:59 6/12/20


12:05 


 





 


Glucose (Fingerstick)


 195 mg/dL


(70-99) 


 


 





 


Iron Level


 


 31 ug/dL


() 


 





 


Total Iron Binding Capacity


 


 218 ug/dL


(250-450) 


 





 


Iron Saturation  14 % (15-34)   


 


Vitamin B12 Level


 


 340 pg/mL


(247-911) 


 











Laboratory Tests








Test


 6/11/20


16:50 6/11/20


21:15 6/12/20


07:19 6/12/20


10:30


 


White Blood Count


 15.7 x10^3/uL


(4.0-11.0) 


 


 9.5 x10^3/uL


(4.0-11.0)


 


Red Blood Count


 4.33 x10^6/uL


(3.50-5.40) 


 


 4.20 x10^6/uL


(3.50-5.40)


 


Hemoglobin


 12.4 g/dL


(12.0-15.5) 


 


 11.8 g/dL


(12.0-15.5)


 


Hematocrit


 36.2 %


(36.0-47.0) 


 


 35.1 %


(36.0-47.0)


 


Mean Corpuscular Volume 84 fL ()    83 fL () 


 


Mean Corpuscular Hemoglobin 29 pg (25-35)    28 pg (25-35) 


 


Mean Corpuscular Hemoglobin


Concent 34 g/dL


(31-37) 


 


 34 g/dL


(31-37)


 


Red Cell Distribution Width


 15.0 %


(11.5-14.5) 


 


 15.0 %


(11.5-14.5)


 


Platelet Count


 400 x10^3/uL


(140-400) 


 


 344 x10^3/uL


(140-400)


 


Neutrophils (%) (Auto) 76 % (31-73)    67 % (31-73) 


 


Lymphocytes (%) (Auto) 13 % (24-48)    20 % (24-48) 


 


Monocytes (%) (Auto) 10 % (0-9)    12 % (0-9) 


 


Eosinophils (%) (Auto) 0 % (0-3)    1 % (0-3) 


 


Basophils (%) (Auto) 1 % (0-3)    1 % (0-3) 


 


Neutrophils # (Auto)


 12.0 x10^3/uL


(1.8-7.7) 


 


 6.3 x10^3/uL


(1.8-7.7)


 


Lymphocytes # (Auto)


 2.1 x10^3/uL


(1.0-4.8) 


 


 1.9 x10^3/uL


(1.0-4.8)


 


Monocytes # (Auto)


 1.5 x10^3/uL


(0.0-1.1) 


 


 1.1 x10^3/uL


(0.0-1.1)


 


Eosinophils # (Auto)


 0.0 x10^3/uL


(0.0-0.7) 


 


 0.1 x10^3/uL


(0.0-0.7)


 


Basophils # (Auto)


 0.1 x10^3/uL


(0.0-0.2) 


 


 0.1 x10^3/uL


(0.0-0.2)


 


Prothrombin Time


 14.0 SEC


(11.7-14.0) 


 


 





 


Prothromb Time International


Ratio 1.1 (0.8-1.1) 


 


 


 





 


Activated Partial


Thromboplast Time 30 SEC (24-38) 


 


 


 





 


Urine Collection Type U cath    


 


Urine Color Tatiana    


 


Urine Clarity Cloudy    


 


Urine pH 6.0 (<5.0-8.0)    


 


Urine Specific Gravity


 1.020


(1.000-1.030) 


 


 





 


Urine Protein


 100 mg/dL


(NEG-TRACE) 


 


 





 


Urine Glucose (UA)


 Negative mg/dL


(NEG) 


 


 





 


Urine Ketones (Stick)


 Negative mg/dL


(NEG) 


 


 





 


Urine Blood Large (NEG)    


 


Urine Nitrite Negative (NEG)    


 


Urine Bilirubin Small (NEG)    


 


Urine Urobilinogen Dipstick


 0.2 mg/dL (0.2


mg/dL) 


 


 





 


Urine Leukocyte Esterase Large (NEG)    


 


Urine RBC


 20-40 /HPF


(0-2) 


 


 





 


Urine WBC >40 /HPF (0-4)    


 


Urine Squamous Epithelial


Cells Mod /LPF 


 


 


 





 


Urine Bacteria


 Many /HPF


(0-FEW) 


 


 





 


Sodium Level


 134 mmol/L


(136-145) 


 


 136 mmol/L


(136-145)


 


Potassium Level


 4.9 mmol/L


(3.5-5.1) 


 


 4.2 mmol/L


(3.5-5.1)


 


Chloride Level


 95 mmol/L


() 


 


 97 mmol/L


()


 


Carbon Dioxide Level


 25 mmol/L


(21-32) 


 


 26 mmol/L


(21-32)


 


Anion Gap 14 (6-14)    13 (6-14) 


 


Blood Urea Nitrogen


 72 mg/dL


(7-20) 


 


 72 mg/dL


(7-20)


 


Creatinine


 4.5 mg/dL


(0.6-1.0) 


 


 3.2 mg/dL


(0.6-1.0)


 


Estimated GFR


(Cockcroft-Gault) 9.8 


 


 


 14.5 





 


BUN/Creatinine Ratio 16 (6-20)    23 (6-20) 


 


Glucose Level


 187 mg/dL


(70-99) 


 


 221 mg/dL


(70-99)


 


Lactic Acid Level


 1.7 mmol/L


(0.4-2.0) 1.0 mmol/L


(0.4-2.0) 


 





 


Calcium Level


 9.1 mg/dL


(8.5-10.1) 


 


 8.7 mg/dL


(8.5-10.1)


 


Magnesium Level


 1.5 mg/dL


(1.8-2.4) 


 


 





 


Total Bilirubin


 0.8 mg/dL


(0.2-1.0) 


 


 0.4 mg/dL


(0.2-1.0)


 


Aspartate Amino Transf


(AST/SGOT) 27 U/L (15-37) 


 


 


 25 U/L (15-37) 





 


Alanine Aminotransferase


(ALT/SGPT) 19 U/L (14-59) 


 


 


 20 U/L (14-59) 





 


Alkaline Phosphatase


 85 U/L


() 


 


 84 U/L


()


 


Creatine Kinase


 522 U/L


() 


 


 





 


Creatine Kinase MB (Mass)


 2.0 ng/mL


(0.0-3.6) 


 


 





 


Creatine Kinase MB Relative


Index 0.4 % (0-4) 


 


 


 





 


Troponin I Quantitative


 < 0.017 ng/mL


(0.000-0.055) 


 


 





 


Total Protein


 7.9 g/dL


(6.4-8.2) 


 


 7.4 g/dL


(6.4-8.2)


 


Albumin


 3.3 g/dL


(3.4-5.0) 


 


 3.0 g/dL


(3.4-5.0)


 


Albumin/Globulin Ratio 0.7 (1.0-1.7)    0.7 (1.0-1.7) 


 


Lipase


 191 U/L


() 


 


 





 


Acetone Level Neg (NEG)    


 


Glucose (Fingerstick)


 


 


 136 mg/dL


(70-99) 





 


Test


 6/12/20


10:59 6/12/20


12:05 


 





 


Glucose (Fingerstick)


 195 mg/dL


(70-99) 


 


 





 


Iron Level


 


 31 ug/dL


() 


 





 


Total Iron Binding Capacity


 


 218 ug/dL


(250-450) 


 





 


Iron Saturation  14 % (15-34)   


 


Vitamin B12 Level


 


 340 pg/mL


(247-911) 


 














Assessment/Plan


Assessment/Plan


A: Chronic Renal Failure


     CHTN


     DM TYpe 2


     Morbid Obesity


    Pelvic mass


P: CA-125 level.  Recommend removal pelvic mass, however this facility is unable

to accommodate due severe comorbidities and lack of Urolgist or Gyn/Onc.  

Recommend referral to Gyn/Onc [facility] or  for operative care.  Thank you

for consult.











CHECO DAY Jr, MD          Jun 12, 2020 15:29

## 2020-06-12 NOTE — PDOC2
GI CONSULT


Reason For Consult:


abdominal mass on cat scan





HPI:


HPI:


64 y/o female who tells me she came to the hospital for "not eating."  Felt ill 

about 4 days ago w/ dizziness and decreased appetite.  Associated w/ some n/v 

and a few episodes of watery diarrhea.  Then had some RLQ discomfort that was 

better when laying still about 2 days ago.  Pain has resolved.  Thinks she lost 

13 pounds during the past 4 days.





H/o GERD controlled w/ pantoprazole.  No dysphagia, hematemesis, constipation, 

hematochezia, or melena.


No previous EGD.  Colonoscopy ~6 years ago (office notes say in 2002) reportedly

normal.  Was seen in the office in 2018 to schedule screening colonoscopy which 

was not done.


S/p cholecystectomy (doesn't know about stones).  Denies liver, pancreas, and 

PUD history.  Takes Rx med for back pain.





Thinks both ovaries were removed during hysterectomy.


ER note mentions decreased urination.





PMH:


PMH:


HTN, HLD, COPD, GERD, depression/anxiety, hepatic steatosis, 


tubal ligation, hysterectomy, removal of leg and lower abdominal cysts, 

cholecystectomy





FH:


Family History:  No pertinent hx (deneis GI cancers), DM, Hypertension





Social History:


Smoke:  No


ALCOHOL:  none


Drugs:  None





ROS:





GEN: +sweats


HEENT: Denies blurred vision, sore throat


CV: Denies chest pain


RESP: Denies shortness of air, cough


GI: Per HPI


: Denies hematuria, dysuria


ENDO: +weight loss


NEURO: Denies confusion, dizziness


MSK: Denies weakness, joint pain/swelling


SKIN: Denies jaundice, pruritus





Vitals:


Vitals:





                                   Vital Signs








  Date Time  Temp Pulse Resp B/P (MAP) Pulse Ox O2 Delivery O2 Flow Rate FiO2


 


6/12/20 07:40      Nasal Cannula 3.0 


 


6/12/20 07:35 98.2 85 18 111/49 (69) 98   





 98.2       











Labs:


Labs:





Laboratory Tests








Test


 6/11/20


16:50 6/11/20


21:15 6/12/20


07:19 6/12/20


10:30


 


White Blood Count


 15.7 x10^3/uL


(4.0-11.0) 


 


 9.5 x10^3/uL


(4.0-11.0)


 


Red Blood Count


 4.33 x10^6/uL


(3.50-5.40) 


 


 4.20 x10^6/uL


(3.50-5.40)


 


Hemoglobin


 12.4 g/dL


(12.0-15.5) 


 


 11.8 g/dL


(12.0-15.5)


 


Hematocrit


 36.2 %


(36.0-47.0) 


 


 35.1 %


(36.0-47.0)


 


Mean Corpuscular Volume 84 fL ()    83 fL () 


 


Mean Corpuscular Hemoglobin 29 pg (25-35)    28 pg (25-35) 


 


Mean Corpuscular Hemoglobin


Concent 34 g/dL


(31-37) 


 


 34 g/dL


(31-37)


 


Red Cell Distribution Width


 15.0 %


(11.5-14.5) 


 


 15.0 %


(11.5-14.5)


 


Platelet Count


 400 x10^3/uL


(140-400) 


 


 344 x10^3/uL


(140-400)


 


Neutrophils (%) (Auto) 76 % (31-73)    67 % (31-73) 


 


Lymphocytes (%) (Auto) 13 % (24-48)    20 % (24-48) 


 


Monocytes (%) (Auto) 10 % (0-9)    12 % (0-9) 


 


Eosinophils (%) (Auto) 0 % (0-3)    1 % (0-3) 


 


Basophils (%) (Auto) 1 % (0-3)    1 % (0-3) 


 


Neutrophils # (Auto)


 12.0 x10^3/uL


(1.8-7.7) 


 


 6.3 x10^3/uL


(1.8-7.7)


 


Lymphocytes # (Auto)


 2.1 x10^3/uL


(1.0-4.8) 


 


 1.9 x10^3/uL


(1.0-4.8)


 


Monocytes # (Auto)


 1.5 x10^3/uL


(0.0-1.1) 


 


 1.1 x10^3/uL


(0.0-1.1)


 


Eosinophils # (Auto)


 0.0 x10^3/uL


(0.0-0.7) 


 


 0.1 x10^3/uL


(0.0-0.7)


 


Basophils # (Auto)


 0.1 x10^3/uL


(0.0-0.2) 


 


 0.1 x10^3/uL


(0.0-0.2)


 


Prothrombin Time


 14.0 SEC


(11.7-14.0) 


 


 





 


Prothromb Time International


Ratio 1.1 (0.8-1.1) 


 


 


 





 


Activated Partial


Thromboplast Time 30 SEC (24-38) 


 


 


 





 


Urine Collection Type U cath    


 


Urine Color Tatiana    


 


Urine Clarity Cloudy    


 


Urine pH 6.0 (<5.0-8.0)    


 


Urine Specific Gravity


 1.020


(1.000-1.030) 


 


 





 


Urine Protein


 100 mg/dL


(NEG-TRACE) 


 


 





 


Urine Glucose (UA)


 Negative mg/dL


(NEG) 


 


 





 


Urine Ketones (Stick)


 Negative mg/dL


(NEG) 


 


 





 


Urine Blood Large (NEG)    


 


Urine Nitrite Negative (NEG)    


 


Urine Bilirubin Small (NEG)    


 


Urine Urobilinogen Dipstick


 0.2 mg/dL (0.2


mg/dL) 


 


 





 


Urine Leukocyte Esterase Large (NEG)    


 


Urine RBC


 20-40 /HPF


(0-2) 


 


 





 


Urine WBC >40 /HPF (0-4)    


 


Urine Squamous Epithelial


Cells Mod /LPF 


 


 


 





 


Urine Bacteria


 Many /HPF


(0-FEW) 


 


 





 


Sodium Level


 134 mmol/L


(136-145) 


 


 136 mmol/L


(136-145)


 


Potassium Level


 4.9 mmol/L


(3.5-5.1) 


 


 4.2 mmol/L


(3.5-5.1)


 


Chloride Level


 95 mmol/L


() 


 


 97 mmol/L


()


 


Carbon Dioxide Level


 25 mmol/L


(21-32) 


 


 26 mmol/L


(21-32)


 


Anion Gap 14 (6-14)    13 (6-14) 


 


Blood Urea Nitrogen


 72 mg/dL


(7-20) 


 


 72 mg/dL


(7-20)


 


Creatinine


 4.5 mg/dL


(0.6-1.0) 


 


 3.2 mg/dL


(0.6-1.0)


 


Estimated GFR


(Cockcroft-Gault) 9.8 


 


 


 14.5 





 


BUN/Creatinine Ratio 16 (6-20)    23 (6-20) 


 


Glucose Level


 187 mg/dL


(70-99) 


 


 221 mg/dL


(70-99)


 


Lactic Acid Level


 1.7 mmol/L


(0.4-2.0) 1.0 mmol/L


(0.4-2.0) 


 





 


Calcium Level


 9.1 mg/dL


(8.5-10.1) 


 


 8.7 mg/dL


(8.5-10.1)


 


Magnesium Level


 1.5 mg/dL


(1.8-2.4) 


 


 





 


Total Bilirubin


 0.8 mg/dL


(0.2-1.0) 


 


 0.4 mg/dL


(0.2-1.0)


 


Aspartate Amino Transf


(AST/SGOT) 27 U/L (15-37) 


 


 


 25 U/L (15-37) 





 


Alanine Aminotransferase


(ALT/SGPT) 19 U/L (14-59) 


 


 


 20 U/L (14-59) 





 


Alkaline Phosphatase


 85 U/L


() 


 


 84 U/L


()


 


Creatine Kinase


 522 U/L


() 


 


 





 


Creatine Kinase MB (Mass)


 2.0 ng/mL


(0.0-3.6) 


 


 





 


Creatine Kinase MB Relative


Index 0.4 % (0-4) 


 


 


 





 


Troponin I Quantitative


 < 0.017 ng/mL


(0.000-0.055) 


 


 





 


Total Protein


 7.9 g/dL


(6.4-8.2) 


 


 7.4 g/dL


(6.4-8.2)


 


Albumin


 3.3 g/dL


(3.4-5.0) 


 


 3.0 g/dL


(3.4-5.0)


 


Albumin/Globulin Ratio 0.7 (1.0-1.7)    0.7 (1.0-1.7) 


 


Lipase


 191 U/L


() 


 


 





 


Acetone Level Neg (NEG)    


 


Glucose (Fingerstick)


 


 


 136 mg/dL


(70-99) 





 


Test


 6/12/20


10:59 


 


 





 


Glucose (Fingerstick)


 195 mg/dL


(70-99) 


 


 














Allergies:


Coded Allergies:  


     sitagliptin (Verified  Adverse Reaction, Intermediate, HAIR LOSS, 12/26/18)





Medications:





Current Medications








 Medications


  (Trade)  Dose


 Ordered  Sig/Dara


 Route


 PRN Reason  Start Time


 Stop Time Status Last Admin


Dose Admin


 


 Ondansetron HCl


  (Zofran)  4 mg  1X  ONCE


 IVP


   6/11/20 16:15


 6/11/20 16:22 DC 6/11/20 16:54





 


 Famotidine


  (Pepcid Vial)  20 mg  1X  ONCE


 IVP


   6/11/20 16:15


 6/11/20 16:22 DC 6/11/20 16:55





 


 Sodium Chloride  1,000 ml @ 


 1,000 mls/hr  1X  ONCE


 IV


   6/11/20 16:15


 6/11/20 17:14 DC 6/11/20 16:53





 


 Ceftriaxone Sodium


  (Rocephin)  1 gm  1X  ONCE


 IVP


   6/11/20 18:00


 6/11/20 18:01 DC 6/11/20 18:24





 


 Magnesium Sulfate  50 ml @ 25


 mls/hr  1X  ONCE


 IV


   6/11/20 18:15


 6/11/20 20:14 DC 6/11/20 18:31














Imaging:


Imaging:


C/A/P CT


CHEST:


1. Degraded study due to body habitus. Portions of the chest wall are outside 

the field-of-view.


2. No acute abnormality seen throughout the chest.


3. Chronic findings to include calcific coronary artery disease.


Abdomen/pelvis:


1. Portions of the body wall and a small portion of the right abdomen are 

excluded from the field-of-view.


2. Mild right more so than left perinephric fat stranding but no obstructing 

stone is seen. There is a 7 mm somewhat linear intrarenal stone on the right. In

the setting of perinephric fat stranding consider correlation with urinalysis to

exclude a urinary tract infection.


3. Lobulated mass-like structure at the lower abdomen extending into the upper 

pelvis measuring 9.3 x 9.2 x 6.9 cm. No uterus is seen and the left ovary may be

surgically absent. No normal right ovary is identified and it is possible that 

this mass is ovarian in origin. The internal density is 


approximately 40 Hounsfield units confirming that this is not a simple ovarian 

cyst or simple postoperative fluid collection. Sonography would beuseful to help

assess for malignant features but could be limited due to patient body habitus. 

If renal function permits the use of contrast, eventual CT or MRI of this region

could be performed. If the patient cannot receive contrast and ultrasound is 

unrevealing, unenhanced MRI 


would be the next best study.


4. Hepatosplenomegaly.





Pelv US


IMPRESSION: Hypoechoic mass in the pelvis measuring at least 9 cm in diameter. 

This has some through transmission and may be at least partially cystic but is 

suboptimal evaluated by ultrasound due to the large size of the mass and patient

body habitus. Recommend MRI to further evaluate.





PE:





GEN: NAD


HEENT: Atraumatic, PERRL


LUNGS: diminished, NC 3L


HEART: RRR


ABD: NABS, S/NT, obese


EXTREMITY: No edema


SKIN: No rashes, no jaundice


NEURO/PSYCH: A & O 3





A/P:


A/P:


Anorexia, n/v, diarrhea, weight loss, dizziness


LULU, ?UTI


Pelvic mass


GERD - on PPI


CRC screen - reportedly normal colonoscopy years ago


S/p cholecystectomy


Hepatosplenomegaly





--


Continue per nephrology, await GYN opinion.











KIRAN SHARIF         Jun 12, 2020 11:12

## 2020-06-12 NOTE — PDOC
[facility] PROGRESS NOTE


Chief Complaint


Chief Complaint


New abdominal/pelvic mass discovered on CAT scan yesterday (10 cm)


Pyelonephritis


Acute kidney injury with a creatinine of 4.5 (it was 0.9 just a year ago)


CHF, COPD, diabetes, hypertension, hyperlipidemia,


obesity, cholecystectomy, hysterectomy, tubal ligation, bladder cyst, right leg


surgery.





History of Present Illness


History of Present Illness


6/12/2020


Patient seen and examined


She is tolerating her diet


Chart reviewed


Discussed with RN


Awaiting GI OB/GYN oncology and nephrology consults for later today





Vitals/I&O


Vitals/I&O:





                                   Vital Signs








  Date Time  Temp Pulse Resp B/P (MAP) Pulse Ox O2 Delivery O2 Flow Rate FiO2


 


6/12/20 07:40      Nasal Cannula 3.0 


 


6/12/20 07:35 98.2 85 18 111/49 (69) 98   





 98.2       














                                    I & O   


 


 6/11/20 6/11/20 6/12/20





 15:00 23:00 07:00


 


Intake Total  1240 ml 400 ml


 


Balance  1240 ml 400 ml











Labs


Labs:





Laboratory Tests








Test


 6/11/20


16:50 6/11/20


21:15 6/12/20


07:19


 


White Blood Count


 15.7 x10^3/uL


(4.0-11.0) 


 





 


Red Blood Count


 4.33 x10^6/uL


(3.50-5.40) 


 





 


Hemoglobin


 12.4 g/dL


(12.0-15.5) 


 





 


Hematocrit


 36.2 %


(36.0-47.0) 


 





 


Mean Corpuscular Volume 84 fL ()   


 


Mean Corpuscular Hemoglobin 29 pg (25-35)   


 


Mean Corpuscular Hemoglobin


Concent 34 g/dL


(31-37) 


 





 


Red Cell Distribution Width


 15.0 %


(11.5-14.5) 


 





 


Platelet Count


 400 x10^3/uL


(140-400) 


 





 


Neutrophils (%) (Auto) 76 % (31-73)   


 


Lymphocytes (%) (Auto) 13 % (24-48)   


 


Monocytes (%) (Auto) 10 % (0-9)   


 


Eosinophils (%) (Auto) 0 % (0-3)   


 


Basophils (%) (Auto) 1 % (0-3)   


 


Neutrophils # (Auto)


 12.0 x10^3/uL


(1.8-7.7) 


 





 


Lymphocytes # (Auto)


 2.1 x10^3/uL


(1.0-4.8) 


 





 


Monocytes # (Auto)


 1.5 x10^3/uL


(0.0-1.1) 


 





 


Eosinophils # (Auto)


 0.0 x10^3/uL


(0.0-0.7) 


 





 


Basophils # (Auto)


 0.1 x10^3/uL


(0.0-0.2) 


 





 


Prothrombin Time


 14.0 SEC


(11.7-14.0) 


 





 


Prothromb Time International


Ratio 1.1 (0.8-1.1) 


 


 





 


Activated Partial


Thromboplast Time 30 SEC (24-38) 


 


 





 


Urine Collection Type U cath   


 


Urine Color Tatiana   


 


Urine Clarity Cloudy   


 


Urine pH 6.0 (<5.0-8.0)   


 


Urine Specific Gravity


 1.020


(1.000-1.030) 


 





 


Urine Protein


 100 mg/dL


(NEG-TRACE) 


 





 


Urine Glucose (UA)


 Negative mg/dL


(NEG) 


 





 


Urine Ketones (Stick)


 Negative mg/dL


(NEG) 


 





 


Urine Blood Large (NEG)   


 


Urine Nitrite Negative (NEG)   


 


Urine Bilirubin Small (NEG)   


 


Urine Urobilinogen Dipstick


 0.2 mg/dL (0.2


mg/dL) 


 





 


Urine Leukocyte Esterase Large (NEG)   


 


Urine RBC


 20-40 /HPF


(0-2) 


 





 


Urine WBC >40 /HPF (0-4)   


 


Urine Squamous Epithelial


Cells Mod /LPF 


 


 





 


Urine Bacteria


 Many /HPF


(0-FEW) 


 





 


Sodium Level


 134 mmol/L


(136-145) 


 





 


Potassium Level


 4.9 mmol/L


(3.5-5.1) 


 





 


Chloride Level


 95 mmol/L


() 


 





 


Carbon Dioxide Level


 25 mmol/L


(21-32) 


 





 


Anion Gap 14 (6-14)   


 


Blood Urea Nitrogen


 72 mg/dL


(7-20) 


 





 


Creatinine


 4.5 mg/dL


(0.6-1.0) 


 





 


Estimated GFR


(Cockcroft-Gault) 9.8 


 


 





 


BUN/Creatinine Ratio 16 (6-20)   


 


Glucose Level


 187 mg/dL


(70-99) 


 





 


Lactic Acid Level


 1.7 mmol/L


(0.4-2.0) 1.0 mmol/L


(0.4-2.0) 





 


Calcium Level


 9.1 mg/dL


(8.5-10.1) 


 





 


Magnesium Level


 1.5 mg/dL


(1.8-2.4) 


 





 


Total Bilirubin


 0.8 mg/dL


(0.2-1.0) 


 





 


Aspartate Amino Transf


(AST/SGOT) 27 U/L (15-37) 


 


 





 


Alanine Aminotransferase


(ALT/SGPT) 19 U/L (14-59) 


 


 





 


Alkaline Phosphatase


 85 U/L


() 


 





 


Creatine Kinase


 522 U/L


() 


 





 


Creatine Kinase MB (Mass)


 2.0 ng/mL


(0.0-3.6) 


 





 


Creatine Kinase MB Relative


Index 0.4 % (0-4) 


 


 





 


Troponin I Quantitative


 < 0.017 ng/mL


(0.000-0.055) 


 





 


Total Protein


 7.9 g/dL


(6.4-8.2) 


 





 


Albumin


 3.3 g/dL


(3.4-5.0) 


 





 


Albumin/Globulin Ratio 0.7 (1.0-1.7)   


 


Lipase


 191 U/L


() 


 





 


Acetone Level Neg (NEG)   


 


Glucose (Fingerstick)


 


 


 136 mg/dL


(70-99)











Assessment and Plan


Assessmemt and Plan


Problems


Medical Problems:


(1) Abdominal mass


Status: Acute  





(2) Hypomagnesemia


Status: Acute  





(3) Pyelonephritis


Status: Acute  





(4) Renal failure


Status: Acute  





New abdominal/pelvic mass discovered on CAT scan yesterday (10 cm)


Pyelonephritis


Acute kidney injury with a creatinine of 4.5 (it was 0.9 just a year ago)


CHF, COPD, diabetes, hypertension, hyperlipidemia,


obesity, cholecystectomy, hysterectomy, tubal ligation, bladder cyst, right leg


surgery.





Plan


Await GI nephrology OB/GYN and oncology input later today


For now continue home meds


DVT prophylaxis


IV fluids


IV antibiotics


Trend labs


Full code





Comment


Review of Relevant


I have reviewed the following items ana (where applicable) has been applied.


Medications:





Current Medications








 Medications


  (Trade)  Dose


 Ordered  Sig/Dara


 Route


 PRN Reason  Start Time


 Stop Time Status Last Admin


Dose Admin


 


 Ondansetron HCl


  (Zofran)  4 mg  1X  ONCE


 IVP


   6/11/20 16:15


 6/11/20 16:22 DC 6/11/20 16:54





 


 Famotidine


  (Pepcid Vial)  20 mg  1X  ONCE


 IVP


   6/11/20 16:15


 6/11/20 16:22 DC 6/11/20 16:55





 


 Sodium Chloride  1,000 ml @ 


 1,000 mls/hr  1X  ONCE


 IV


   6/11/20 16:15


 6/11/20 17:14 DC 6/11/20 16:53





 


 Ceftriaxone Sodium


  (Rocephin)  1 gm  1X  ONCE


 IVP


   6/11/20 18:00


 6/11/20 18:01 DC 6/11/20 18:24





 


 Magnesium Sulfate  50 ml @ 25


 mls/hr  1X  ONCE


 IV


   6/11/20 18:15


 6/11/20 20:14 DC 6/11/20 18:31














Justicifation of Admission Dx:


Justifications for Admission:


Justification of Admission Dx:  Yes


Acute Renal Failure:  Serum Cr > 4mg/dL











ONI HALE III DO           Jun 12, 2020 10:38

## 2020-06-13 VITALS — DIASTOLIC BLOOD PRESSURE: 37 MMHG | SYSTOLIC BLOOD PRESSURE: 132 MMHG

## 2020-06-13 VITALS — DIASTOLIC BLOOD PRESSURE: 53 MMHG | SYSTOLIC BLOOD PRESSURE: 97 MMHG

## 2020-06-13 VITALS — SYSTOLIC BLOOD PRESSURE: 99 MMHG | DIASTOLIC BLOOD PRESSURE: 52 MMHG

## 2020-06-13 VITALS — DIASTOLIC BLOOD PRESSURE: 54 MMHG | SYSTOLIC BLOOD PRESSURE: 116 MMHG

## 2020-06-13 VITALS — SYSTOLIC BLOOD PRESSURE: 104 MMHG | DIASTOLIC BLOOD PRESSURE: 82 MMHG

## 2020-06-13 VITALS — SYSTOLIC BLOOD PRESSURE: 146 MMHG | DIASTOLIC BLOOD PRESSURE: 89 MMHG

## 2020-06-13 LAB
ANION GAP SERPL CALC-SCNC: 11 MMOL/L (ref 6–14)
BUN SERPL-MCNC: 67 MG/DL (ref 7–20)
CALCIUM SERPL-MCNC: 8.5 MG/DL (ref 8.5–10.1)
CHLORIDE SERPL-SCNC: 101 MMOL/L (ref 98–107)
CO2 SERPL-SCNC: 28 MMOL/L (ref 21–32)
CREAT SERPL-MCNC: 2.5 MG/DL (ref 0.6–1)
GFR SERPLBLD BASED ON 1.73 SQ M-ARVRAT: 19.3 ML/MIN
GLUCOSE SERPL-MCNC: 183 MG/DL (ref 70–99)
POTASSIUM SERPL-SCNC: 4.1 MMOL/L (ref 3.5–5.1)
SODIUM SERPL-SCNC: 140 MMOL/L (ref 136–145)

## 2020-06-13 RX ADMIN — POTASSIUM CHLORIDE SCH MEQ: 1500 TABLET, EXTENDED RELEASE ORAL at 08:04

## 2020-06-13 RX ADMIN — POTASSIUM CHLORIDE SCH MEQ: 1500 TABLET, EXTENDED RELEASE ORAL at 16:50

## 2020-06-13 RX ADMIN — BACITRACIN SCH MLS/HR: 5000 INJECTION, POWDER, FOR SOLUTION INTRAMUSCULAR at 11:42

## 2020-06-13 RX ADMIN — INSULIN LISPRO SCH UNITS: 100 INJECTION, SOLUTION INTRAVENOUS; SUBCUTANEOUS at 16:52

## 2020-06-13 RX ADMIN — LEVOTHYROXINE SODIUM SCH MCG: 88 TABLET ORAL at 05:45

## 2020-06-13 RX ADMIN — POTASSIUM CHLORIDE SCH MEQ: 1500 TABLET, EXTENDED RELEASE ORAL at 11:41

## 2020-06-13 RX ADMIN — REPAGLINIDE SCH MG: 0.5 TABLET ORAL at 07:30

## 2020-06-13 RX ADMIN — BACITRACIN SCH MLS/HR: 5000 INJECTION, POWDER, FOR SOLUTION INTRAMUSCULAR at 08:00

## 2020-06-13 RX ADMIN — FUROSEMIDE SCH MG: 40 TABLET ORAL at 08:04

## 2020-06-13 RX ADMIN — BACITRACIN SCH MLS/HR: 5000 INJECTION, POWDER, FOR SOLUTION INTRAMUSCULAR at 21:09

## 2020-06-13 RX ADMIN — INSULIN GLARGINE SCH UNIT: 100 INJECTION, SOLUTION SUBCUTANEOUS at 21:00

## 2020-06-13 RX ADMIN — ATORVASTATIN CALCIUM SCH MG: 10 TABLET, FILM COATED ORAL at 21:09

## 2020-06-13 RX ADMIN — CEFTRIAXONE SCH GM: 1 INJECTION, POWDER, FOR SOLUTION INTRAMUSCULAR; INTRAVENOUS at 11:42

## 2020-06-13 RX ADMIN — TIZANIDINE SCH MG: 4 TABLET ORAL at 21:09

## 2020-06-13 RX ADMIN — INSULIN LISPRO SCH UNITS: 100 INJECTION, SOLUTION INTRAVENOUS; SUBCUTANEOUS at 11:42

## 2020-06-13 RX ADMIN — CITALOPRAM HYDROBROMIDE SCH MG: 20 TABLET ORAL at 08:07

## 2020-06-13 RX ADMIN — PANTOPRAZOLE SODIUM SCH MG: 40 TABLET, DELAYED RELEASE ORAL at 08:04

## 2020-06-13 RX ADMIN — INSULIN LISPRO SCH UNITS: 100 INJECTION, SOLUTION INTRAVENOUS; SUBCUTANEOUS at 08:15

## 2020-06-13 RX ADMIN — ASPIRIN 81 MG SCH MG: 81 TABLET ORAL at 08:04

## 2020-06-13 NOTE — PDOC
[facility] PROGRESS NOTE


Chief Complaint


Chief Complaint


New abdominal/pelvic mass discovered on CAT scan yesterday (10 cm)


Pyelonephritis


Acute kidney injury with a creatinine of 4.5 (it was 0.9 just a year ago)


CHF, COPD, diabetes, hypertension, hyperlipidemia,


obesity, cholecystectomy, hysterectomy, tubal ligation, bladder cyst, right leg


surgery.





History of Present Illness


History of Present Illness


6/13/2020


Patient seen and examined


Discussed with her daughter and her son-in-law


Discussed with RN


Creatinine is down to 2.5


Ca-125 level was within normal limits at 9


CEA level pending


Chart reviewed








6/12/2020


Patient seen and examined


She is tolerating her diet


Chart reviewed


Discussed with RN


Awaiting GI OB/GYN oncology and nephrology consults for later today





Vitals/I&O


Vitals/I&O:





                                   Vital Signs








  Date Time  Temp Pulse Resp B/P (MAP) Pulse Ox O2 Delivery O2 Flow Rate FiO2


 


6/13/20 11:13 97.9 84 18 116/54 (74) 98 Nasal Cannula 3.0 





 97.9       














                                    I & O   


 


 6/12/20 6/12/20 6/13/20





 15:00 23:00 07:00


 


Intake Total 450 ml 240 ml 


 


Balance 450 ml 240 ml 











Physical Exam


General:  Alert, Cooperative


Heart:  Regular rate


Abdomen:  Soft, No tenderness, Other (pelvic deferred.)


Extremities:  No cyanosis


Skin:  No rashes





Labs


Labs:





Laboratory Tests








Test


 6/12/20


16:43 6/12/20


20:11 6/13/20


02:58 6/13/20


03:10


 


Glucose (Fingerstick)


 160 mg/dL


(70-99) 142 mg/dL


(70-99) 188 mg/dL


(70-99) 





 


Sodium Level


 


 


 


 140 mmol/L


(136-145)


 


Potassium Level


 


 


 


 4.1 mmol/L


(3.5-5.1)


 


Chloride Level


 


 


 


 101 mmol/L


()


 


Carbon Dioxide Level


 


 


 


 28 mmol/L


(21-32)


 


Anion Gap    11 (6-14) 


 


Blood Urea Nitrogen


 


 


 


 67 mg/dL


(7-20)


 


Creatinine


 


 


 


 2.5 mg/dL


(0.6-1.0)


 


Estimated GFR


(Cockcroft-Gault) 


 


 


 19.3 





 


Glucose Level


 


 


 


 183 mg/dL


(70-99)


 


Calcium Level


 


 


 


 8.5 mg/dL


(8.5-10.1)


 


Test


 6/13/20


07:07 6/13/20


11:21 


 





 


Glucose (Fingerstick)


 131 mg/dL


(70-99) 118 mg/dL


(70-99) 


 














Assessment and Plan


Assessmemt and Plan


Problems


Medical Problems:


(1) Abdominal mass


Status: Acute  





(2) Hypomagnesemia


Status: Acute  





(3) Pyelonephritis


Status: Acute  





(4) Renal failure


Status: Acute  





New abdominal/pelvic mass discovered on CAT scan yesterday (10 cm) (but her CA 

125 level is within normal limits)


Pyelonephritis


Acute kidney injury with a creatinine of 4.5 (it was 0.9 just a year ago)


CHF, COPD, diabetes, hypertension, hyperlipidemia,


obesity, cholecystectomy, hysterectomy, tubal ligation, bladder cyst, right leg


surgery.





Plan


Appreciate subspecialist input


Await CEA level


For now continue home meds


Trending creatinine and it is improving a lot


Trending white count and it is improving a lot as well


DVT prophylaxis


IV fluids


IV antibiotics


Trend labs


Full code


I spoke with  yesterday he would like to have the patient follow-up at 

 or [facility] after discharge


Suspect we could discharge tomorrow with close outpatient follow-up?





Comment


Review of Relevant


I have reviewed the following items ana (where applicable) has been applied.


Medications:





Current Medications








 Medications


  (Trade)  Dose


 Ordered  Sig/Dara


 Route


 PRN Reason  Start Time


 Stop Time Status Last Admin


Dose Admin


 


 Aspirin


  (Aspirin


 Chewable)  81 mg  DAILY


 PO


   6/12/20 13:00


    6/13/20 08:04





 


 Furosemide


  (Lasix)  40 mg  DAILY


 PO


   6/12/20 13:00


    6/13/20 08:04





 


 Levothyroxine


 Sodium


  (Synthroid)  88 mcg  DAILY06


 PO


   6/12/20 13:00


    6/13/20 05:45





 


 Potassium Chloride


  (Klor-Con)  20 meq  TIDWMEALS


 PO


   6/12/20 13:00


    6/13/20 11:41





 


 Tizanidine HCl


  (Zanaflex)  4 mg  QHS


 PO


   6/12/20 21:00


    6/12/20 21:26





 


 Citalopram


 Hydrobromide


  (CeleXA)  20 mg  DAILY


 PO


   6/12/20 13:00


    6/13/20 08:07





 


 Insulin Human


 Lispro


  (HumaLOG)  25 units  TIDWMEALS


 SQ


   6/12/20 17:00


    6/13/20 08:15





 


 Atorvastatin


 Calcium


  (Lipitor)  10 mg  QHS


 PO


   6/12/20 21:00


    6/12/20 21:26





 


 Repaglinide


  (Prandin)  1 mg  TIDAC


 PO


   6/12/20 16:30


 6/13/20 10:51 DC 6/12/20 16:39





 


 Ondansetron HCl


  (Zofran)  8 mg  1X  ONCE


 IVP


   6/12/20 16:30


 6/12/20 16:34 DC 6/12/20 16:39














Justicifation of Admission Dx:


Justifications for Admission:


Justification of Admission Dx:  Yes


Acute Renal Failure:  Serum Cr > 4mg/dL











ONI HALE III DO           Jun 13, 2020 12:14

## 2020-06-14 VITALS — DIASTOLIC BLOOD PRESSURE: 61 MMHG | SYSTOLIC BLOOD PRESSURE: 120 MMHG

## 2020-06-14 VITALS — SYSTOLIC BLOOD PRESSURE: 115 MMHG | DIASTOLIC BLOOD PRESSURE: 51 MMHG

## 2020-06-14 VITALS — DIASTOLIC BLOOD PRESSURE: 51 MMHG | SYSTOLIC BLOOD PRESSURE: 138 MMHG

## 2020-06-14 VITALS — SYSTOLIC BLOOD PRESSURE: 121 MMHG | DIASTOLIC BLOOD PRESSURE: 53 MMHG

## 2020-06-14 VITALS — SYSTOLIC BLOOD PRESSURE: 129 MMHG | DIASTOLIC BLOOD PRESSURE: 65 MMHG

## 2020-06-14 VITALS — SYSTOLIC BLOOD PRESSURE: 119 MMHG | DIASTOLIC BLOOD PRESSURE: 36 MMHG

## 2020-06-14 LAB
ALBUMIN SERPL-MCNC: 3 G/DL (ref 3.4–5)
ALBUMIN/GLOB SERPL: 0.9 {RATIO} (ref 1–1.7)
ALP SERPL-CCNC: 83 U/L (ref 46–116)
ALT SERPL-CCNC: 34 U/L (ref 14–59)
ANION GAP SERPL CALC-SCNC: 11 MMOL/L (ref 6–14)
AST SERPL-CCNC: 34 U/L (ref 15–37)
BILIRUB SERPL-MCNC: 0.1 MG/DL (ref 0.2–1)
BUN SERPL-MCNC: 47 MG/DL (ref 7–20)
BUN/CREAT SERPL: 26 (ref 6–20)
CALCIUM SERPL-MCNC: 8 MG/DL (ref 8.5–10.1)
CHLORIDE SERPL-SCNC: 104 MMOL/L (ref 98–107)
CO2 SERPL-SCNC: 29 MMOL/L (ref 21–32)
CREAT SERPL-MCNC: 1.8 MG/DL (ref 0.6–1)
GFR SERPLBLD BASED ON 1.73 SQ M-ARVRAT: 28.2 ML/MIN
GLOBULIN SER-MCNC: 3.5 G/DL (ref 2.2–3.8)
GLUCOSE SERPL-MCNC: 203 MG/DL (ref 70–99)
POTASSIUM SERPL-SCNC: 5 MMOL/L (ref 3.5–5.1)
PROT SERPL-MCNC: 6.5 G/DL (ref 6.4–8.2)
SODIUM SERPL-SCNC: 144 MMOL/L (ref 136–145)

## 2020-06-14 RX ADMIN — LEVOTHYROXINE SODIUM SCH MCG: 88 TABLET ORAL at 06:56

## 2020-06-14 RX ADMIN — INSULIN LISPRO SCH UNITS: 100 INJECTION, SOLUTION INTRAVENOUS; SUBCUTANEOUS at 12:08

## 2020-06-14 RX ADMIN — BACITRACIN SCH MLS/HR: 5000 INJECTION, POWDER, FOR SOLUTION INTRAMUSCULAR at 17:20

## 2020-06-14 RX ADMIN — TIZANIDINE SCH MG: 4 TABLET ORAL at 21:02

## 2020-06-14 RX ADMIN — BACITRACIN SCH MLS/HR: 5000 INJECTION, POWDER, FOR SOLUTION INTRAMUSCULAR at 06:58

## 2020-06-14 RX ADMIN — INSULIN LISPRO SCH UNITS: 100 INJECTION, SOLUTION INTRAVENOUS; SUBCUTANEOUS at 17:03

## 2020-06-14 RX ADMIN — POTASSIUM CHLORIDE SCH MEQ: 1500 TABLET, EXTENDED RELEASE ORAL at 16:59

## 2020-06-14 RX ADMIN — POTASSIUM CHLORIDE SCH MEQ: 1500 TABLET, EXTENDED RELEASE ORAL at 12:03

## 2020-06-14 RX ADMIN — CEFTRIAXONE SCH GM: 1 INJECTION, POWDER, FOR SOLUTION INTRAMUSCULAR; INTRAVENOUS at 12:03

## 2020-06-14 RX ADMIN — INSULIN LISPRO SCH UNITS: 100 INJECTION, SOLUTION INTRAVENOUS; SUBCUTANEOUS at 08:30

## 2020-06-14 RX ADMIN — ASPIRIN 81 MG SCH MG: 81 TABLET ORAL at 08:21

## 2020-06-14 RX ADMIN — ATORVASTATIN CALCIUM SCH MG: 10 TABLET, FILM COATED ORAL at 21:02

## 2020-06-14 RX ADMIN — POTASSIUM CHLORIDE SCH MEQ: 1500 TABLET, EXTENDED RELEASE ORAL at 08:21

## 2020-06-14 RX ADMIN — PANTOPRAZOLE SODIUM SCH MG: 40 TABLET, DELAYED RELEASE ORAL at 08:21

## 2020-06-14 RX ADMIN — CITALOPRAM HYDROBROMIDE SCH MG: 20 TABLET ORAL at 08:20

## 2020-06-14 RX ADMIN — FUROSEMIDE SCH MG: 40 TABLET ORAL at 08:21

## 2020-06-14 RX ADMIN — INSULIN GLARGINE SCH UNIT: 100 INJECTION, SOLUTION SUBCUTANEOUS at 21:00

## 2020-06-14 NOTE — PDOC
[employer] [facility] PROGRESS NOTE


Chief Complaint


Chief Complaint


New abdominal/pelvic mass discovered on CAT scan 


Pyelonephritis


Acute kidney injury with a creatinine of 4.5 (it was 0.9 just a year ago)


CHF, COPD, diabetes, hypertension, hyperlipidemia,


obesity, cholecystectomy, hysterectomy, tubal ligation, bladder cyst, right leg


surgery.





History of Present Illness


History of Present Illness


6/14/2020


Patient seen and examined


She is approaching her baseline


Creatinine is down to 1.8


Discussed with RN


Chart reviewed











6/13/2020


Patient seen and examined


Discussed with her daughter and her son-in-law


Discussed with RN


Creatinine is down to 2.5


Ca-125 level was within normal limits at 9


CEA level pending


Chart reviewed








6/12/2020


Patient seen and examined


She is tolerating her diet


Chart reviewed


Discussed with RN


Awaiting GI OB/GYN oncology and nephrology consults for later today





Vitals/I&O


Vitals/I&O:





                                   Vital Signs








  Date Time  Temp Pulse Resp B/P (MAP) Pulse Ox O2 Delivery O2 Flow Rate FiO2


 


6/14/20 08:00      Nasal Cannula 2.0 


 


6/14/20 07:00 97.3 75 17 121/53 (75) 98   





 97.3       














                                    I & O   


 


 6/13/20 6/13/20 6/14/20





 15:00 23:00 07:00


 


Intake Total 320 ml 760 ml 240 ml


 


Output Total  1 ml 


 


Balance 320 ml 759 ml 240 ml











Physical Exam


General:  Alert, Cooperative


Heart:  Regular rate


Abdomen:  Soft, No tenderness, Other (pelvic deferred.)


Extremities:  No cyanosis


Skin:  No rashes





Labs


Labs:





Laboratory Tests








Test


 6/13/20


16:22 6/13/20


20:48 6/14/20


03:40 6/14/20


07:38


 


Glucose (Fingerstick)


 195 mg/dL


(70-99) 110 mg/dL


(70-99) 


 138 mg/dL


(70-99)


 


Sodium Level


 


 


 144 mmol/L


(136-145) 





 


Potassium Level


 


 


 5.0 mmol/L


(3.5-5.1) 





 


Chloride Level


 


 


 104 mmol/L


() 





 


Carbon Dioxide Level


 


 


 29 mmol/L


(21-32) 





 


Anion Gap   11 (6-14)  


 


Blood Urea Nitrogen


 


 


 47 mg/dL


(7-20) 





 


Creatinine


 


 


 1.8 mg/dL


(0.6-1.0) 





 


Estimated GFR


(Cockcroft-Gault) 


 


 28.2 


 





 


BUN/Creatinine Ratio   26 (6-20)  


 


Glucose Level


 


 


 203 mg/dL


(70-99) 





 


Calcium Level


 


 


 8.0 mg/dL


(8.5-10.1) 





 


Total Bilirubin


 


 


 0.1 mg/dL


(0.2-1.0) 





 


Aspartate Amino Transf


(AST/SGOT) 


 


 34 U/L (15-37) 


 





 


Alanine Aminotransferase


(ALT/SGPT) 


 


 34 U/L (14-59) 


 





 


Alkaline Phosphatase


 


 


 83 U/L


() 





 


Total Protein


 


 


 6.5 g/dL


(6.4-8.2) 





 


Albumin


 


 


 3.0 g/dL


(3.4-5.0) 





 


Albumin/Globulin Ratio   0.9 (1.0-1.7)  


 


Test


 6/14/20


11:42 


 


 





 


Glucose (Fingerstick)


 145 mg/dL


(70-99) 


 


 














Assessment and Plan


Assessmemt and Plan


Problems


Medical Problems:


(1) Abdominal mass


Status: Acute  





(2) Hypomagnesemia


Status: Acute  





(3) Pyelonephritis


Status: Acute  





(4) Renal failure


Status: Acute  





New abdominal/pelvic mass discovered on CAT scan yesterday (10 cm) (but her CA 

125 level is within normal limits)


Pyelonephritis


Acute kidney injury with a creatinine of 4.5 (it was 0.9 just a year ago)


CHF, COPD, diabetes, hypertension, hyperlipidemia,


obesity, cholecystectomy, hysterectomy, tubal ligation, bladder cyst, right leg


surgery.





Plan


Probable discharge in a.m.


For now see the following:


Await CEA level


Home meds


Trending creatinine and it is improving a lot


Trending white count and it is improving a lot as well


DVT prophylaxis


IV fluids


IV antibiotics


Trend labs


Full code


I spoke with , he would like to have the patient follow-up at  or 

[facility] after discharge


Suspect we could discharge tomorrow with close outpatient follow-up?





Comment


Review of Relevant


I have reviewed the following items ana (where applicable) has been applied.





Justicifation of Admission Dx:


Justifications for Admission:


Justification of Admission Dx:  Yes


Acute Renal Failure:  Serum Cr > 4mg/dL











ONI HALE III DO           Jun 14, 2020 12:07

## 2020-06-15 VITALS — DIASTOLIC BLOOD PRESSURE: 52 MMHG | SYSTOLIC BLOOD PRESSURE: 111 MMHG

## 2020-06-15 VITALS
DIASTOLIC BLOOD PRESSURE: 8 MMHG | SYSTOLIC BLOOD PRESSURE: 125 MMHG | DIASTOLIC BLOOD PRESSURE: 8 MMHG | SYSTOLIC BLOOD PRESSURE: 125 MMHG

## 2020-06-15 VITALS — DIASTOLIC BLOOD PRESSURE: 55 MMHG | SYSTOLIC BLOOD PRESSURE: 126 MMHG

## 2020-06-15 LAB
ANION GAP SERPL CALC-SCNC: 8 MMOL/L (ref 6–14)
BUN SERPL-MCNC: 27 MG/DL (ref 7–20)
CALCIUM SERPL-MCNC: 7.4 MG/DL (ref 8.5–10.1)
CHLORIDE SERPL-SCNC: 105 MMOL/L (ref 98–107)
CO2 SERPL-SCNC: 29 MMOL/L (ref 21–32)
CREAT SERPL-MCNC: 1.2 MG/DL (ref 0.6–1)
GFR SERPLBLD BASED ON 1.73 SQ M-ARVRAT: 45.1 ML/MIN
GLUCOSE SERPL-MCNC: 138 MG/DL (ref 70–99)
POTASSIUM SERPL-SCNC: 4 MMOL/L (ref 3.5–5.1)
SODIUM SERPL-SCNC: 142 MMOL/L (ref 136–145)

## 2020-06-15 RX ADMIN — POTASSIUM CHLORIDE SCH MEQ: 1500 TABLET, EXTENDED RELEASE ORAL at 11:57

## 2020-06-15 RX ADMIN — INSULIN LISPRO SCH UNITS: 100 INJECTION, SOLUTION INTRAVENOUS; SUBCUTANEOUS at 07:41

## 2020-06-15 RX ADMIN — POTASSIUM CHLORIDE SCH MEQ: 1500 TABLET, EXTENDED RELEASE ORAL at 08:26

## 2020-06-15 RX ADMIN — LEVOTHYROXINE SODIUM SCH MCG: 88 TABLET ORAL at 06:22

## 2020-06-15 RX ADMIN — BACITRACIN SCH MLS/HR: 5000 INJECTION, POWDER, FOR SOLUTION INTRAMUSCULAR at 10:00

## 2020-06-15 RX ADMIN — PANTOPRAZOLE SODIUM SCH MG: 40 TABLET, DELAYED RELEASE ORAL at 07:36

## 2020-06-15 RX ADMIN — INSULIN LISPRO SCH UNITS: 100 INJECTION, SOLUTION INTRAVENOUS; SUBCUTANEOUS at 11:57

## 2020-06-15 RX ADMIN — FUROSEMIDE SCH MG: 40 TABLET ORAL at 08:26

## 2020-06-15 RX ADMIN — CEFTRIAXONE SCH GM: 1 INJECTION, POWDER, FOR SOLUTION INTRAMUSCULAR; INTRAVENOUS at 11:57

## 2020-06-15 RX ADMIN — CITALOPRAM HYDROBROMIDE SCH MG: 20 TABLET ORAL at 08:27

## 2020-06-15 RX ADMIN — ASPIRIN 81 MG SCH MG: 81 TABLET ORAL at 08:26

## 2020-06-15 NOTE — DS
DATE OF DISCHARGE:  06/15/2020



ADMISSION DIAGNOSES:  Acute renal failure, pyelonephritis, urinary tract

infection, new incidental finding of a 10 cm pelvic mass.



DISCHARGE DIAGNOSES:

1.  Resolving renal failure (her creatinine went from 4.5 down to 1.8).  I have

another pending this morning.

2.  Resolving urinary tract infection, resolving pyelonephritis.

3.  New finding of a 10 cm mass in her pelvis (her CA-125 level is normal, so we

do not think it is cancer, but we are going to have her follow up at  or

[facility] for a second opinion).

4.  Congestive heart failure, chronic obstructive pulmonary disease, diabetes,

hypertension, hyperlipidemia, obesity, cholecystectomy, hysterectomy, tubal

ligation, bladder cyst, right leg surgery.



CONSULTATIONS:  Dr. Ok Calixto, Dr. Dre Sanford, Dr. Cevallos and Dr. Pinto.



PROCEDURES:  None.



HOSPITAL COURSE:  The patient is a pleasant elderly female who presented with

multiple complaints, and we discovered she was in the kidney failure with a

creatinine of 4.2.  She also had a pelvic mass and UTI.  We admitted the patient

and corrected her electrolytes, gave her IV antibiotics and fluids.  Her

creatinine is approaching normal.  Her CA-125 level is normal.  Overall, she is

doing well.  I saw her and examined her this morning.  Heart tones are normal. 

Lungs are clear.  Abdomen is soft.  We plan to discharge with close outpatient

followup if okay with specialist.



DISPOSITION:  Home.



ACTIVITY:  As tolerated.



DIET:  Low sodium.



MEDICATIONS:  Please see MRAD.



TOTAL TIME:  32 minutes.

 



______________________________

ONI HALE DO DR:  CHILO/javier  JOB#:  [id number] / [id number]

DD:  06/15/2020 10:10  DT:  06/15/2020 10:28

## 2020-06-15 NOTE — PDOC
Subjective:


Subjective:


Glad to get to go home.  Eating and stooling okay, denies pain.





Objective:


Vital Signs:





                                   Vital Signs








  Date Time  Temp Pulse Resp B/P (MAP) Pulse Ox O2 Delivery O2 Flow Rate FiO2


 


6/15/20 07:53      Nasal Cannula 3.0 


 


6/15/20 07:38 98.1 74 18 126/55 (78) 96   





 98.1       








Labs:





Laboratory Tests








Test


 6/14/20


11:42 6/14/20


20:23 6/15/20


07:19 6/15/20


09:40


 


Glucose (Fingerstick) 145 mg/dL  118 mg/dL  148 mg/dL  


 


Sodium Level    142 mmol/L 


 


Potassium Level    4.0 mmol/L 


 


Chloride Level    105 mmol/L 


 


Carbon Dioxide Level    29 mmol/L 


 


Anion Gap    8 


 


Blood Urea Nitrogen    27 mg/dL 


 


Creatinine    1.2 mg/dL 


 


Estimated GFR


(Cockcroft-Gault) 


 


 


 45.1 





 


Glucose Level    138 mg/dL 


 


Calcium Level    7.4 mg/dL 


 


Test


 6/15/20


11:17 


 


 





 


Glucose (Fingerstick) 92 mg/dL    











PE:





GEN: NAD


LUNGS: CTAB


HEART: RRR


ABD: non-tender


NEURO/PSYCH: A & O 3





A/P:


LULU, UTI


Pelvic mass - normal 





--


DC plans noted - she seems unclear re: follow-up - pre GYN note "recommend 

removal pelvic mass - recommend referral to Gyn/Onc [facility] or  for 

operative care."


Also due for screening colonoscopy - our office will contact.





Justicifation of Admission Dx:


Justifications for Admission:


Justification of Admission Dx:  Yes


Acute Renal Failure:  Serum Cr > 4mg/dL











KIRAN SHARIF         Haja 15, 2020 11:29

## 2020-06-15 NOTE — PDOC
SUBJECTIVE


ROS


No complaints or concerns voiced by Pt or RN





OBJECTIVE


Vital Signs





Vital Signs








  Date Time  Temp Pulse Resp B/P (MAP) Pulse Ox O2 Delivery O2 Flow Rate FiO2


 


6/15/20 11:27 97.9 82 20 125/8 (47) 100 Nasal Cannula 3.0 





 97.9       








I & 0











Intake and Output 


 


 6/15/20





 07:00


 


Intake Total 1530 ml


 


Balance 1530 ml


 


 


 


Intake Oral 580 ml


 


IV Total 950 ml


 


# Voids 13


 


# Bowel Movements 1











PHYSICAL EXAM


Physical Exam


General:  Alert, Oriented X3, Cooperative, No acute distress


HEENT:  Atraumatic, PERRLA, EOMI, Mucous membr. moist/pink


Lungs:  Clear to auscultation


Heart:  Regular rate


Abdomen:  Normal bowel sounds, Soft, No tenderness


Extremities:  No cyanosis


Skin:  No breakdown


Neuro:  Normal speech


Psych/Mental Status:  Mental status NL, Mood NL


MUSCULOSKELETAL:  No joint tenderness, No deformity





DIAGNOSIS/ASSESSMENT


Assessment & Plan


LULU- ATN , Cr at presentation 4.5  


Resolving LULU  Cr 1.2  , was 0.9 ja year ago


JERRY-Eliseo cain, good UOP  


FU with PCP post dc , avoid nephrotoxins  





New abdominal/pelvic mass discovered on CAT scan 





Pyelonephritis





CHF- compensated 





COPD





 diabetes,





 hypertension,





COMMENT/RELEVANT DATA


Meds





Current Medications








 Medications


  (Trade)  Dose


 Ordered  Sig/Dara  Start Time


 Stop Time Status Last Admin


Dose Admin


 


 Aspirin


  (Aspirin


 Chewable)  81 mg  DAILY  6/12/20 13:00


    6/15/20 08:26


81 MG


 


 Atorvastatin


 Calcium


  (Lipitor)  10 mg  QHS  6/12/20 21:00


    6/14/20 21:02


10 MG


 


 Buspirone HCl


  (Buspar)  10 mg  PRN Q8HRS  PRN  6/12/20 12:15


     





 


 Ceftriaxone Sodium


  (Rocephin)  1 gm  Q24H  6/12/20 12:00


    6/15/20 11:57


1 GM


 


 Citalopram


 Hydrobromide


  (CeleXA)  20 mg  DAILY  6/12/20 13:00


    6/15/20 08:27


20 MG


 


 Famotidine


  (Pepcid Vial)  20 mg  1X  ONCE  6/11/20 16:15


 6/11/20 16:22 DC 6/11/20 16:55


20 MG


 


 Furosemide


  (Lasix)  40 mg  DAILY  6/12/20 13:00


    6/15/20 08:26


40 MG


 


 Insulin Glargine


  (Lantus Syringe)  65 unit  QHS  6/12/20 21:00


     





 


 Insulin Human


 Lispro


  (HumaLOG)  25 units  TIDWMEALS  6/12/20 17:00


    6/15/20 07:41


25 UNITS


 


 Levothyroxine


 Sodium


  (Synthroid)  88 mcg  DAILY06  6/12/20 13:00


    6/15/20 06:22


88 MCG


 


 Magnesium Sulfate  50 ml @ 25


 mls/hr  1X  ONCE  6/11/20 18:15


 6/11/20 20:14 DC 6/11/20 18:31


25 MLS/HR


 


 Metformin HCl


  (Glucophage)  850 mg  BIDWMEALS  6/12/20 17:00


   UNV  





 


 Non-Formulary


 Medication


  (Lisinopril/


 Hydrochlorothiazide


  (Lisinopril-Hctz


 20-25 Mg Tab))  1 tab  DAILY  6/13/20 09:00


   UNV  





 


 Non-Formulary


 Medication


  (Meloxicam )  1 tab  DAILY  6/13/20 09:00


   UNV  





 


 Ondansetron HCl


  (Zofran)  4 mg  PRN Q8HRS  PRN  6/12/20 16:30


     





 


 Pantoprazole


 Sodium


  (Protonix)  40 mg  DAILYAC  6/12/20 12:00


    6/15/20 07:36


40 MG


 


 Potassium Chloride


  (Klor-Con)  20 meq  TIDWMEALS  6/12/20 13:00


    6/15/20 11:57


20 MEQ


 


 Repaglinide


  (Prandin)  1 mg  TIDAC  6/12/20 16:30


 6/13/20 10:51 DC 6/12/20 16:39


1 MG


 


 Sodium Chloride  1,000 ml @ 


 100 mls/hr  Q10H  6/12/20 12:00


    6/14/20 17:20


100 MLS/HR


 


 Tizanidine HCl


  (Zanaflex)  4 mg  QHS  6/12/20 21:00


    6/14/20 21:02


4 MG








Lab





Laboratory Tests








Test


 6/14/20


20:23 6/15/20


07:19 6/15/20


09:40 6/15/20


11:17


 


Glucose (Fingerstick)


 118 mg/dL


(70-99) 148 mg/dL


(70-99) 


 92 mg/dL


(70-99)


 


Sodium Level


 


 


 142 mmol/L


(136-145) 





 


Potassium Level


 


 


 4.0 mmol/L


(3.5-5.1) 





 


Chloride Level


 


 


 105 mmol/L


() 





 


Carbon Dioxide Level


 


 


 29 mmol/L


(21-32) 





 


Anion Gap   8 (6-14)  


 


Blood Urea Nitrogen


 


 


 27 mg/dL


(7-20) 





 


Creatinine


 


 


 1.2 mg/dL


(0.6-1.0) 





 


Estimated GFR


(Cockcroft-Gault) 


 


 45.1 


 





 


Glucose Level


 


 


 138 mg/dL


(70-99) 





 


Calcium Level


 


 


 7.4 mg/dL


(8.5-10.1) 











Results


All relevant outside records, renal labs, imaging studies, telemetry/EKG's were 

reviewed.





Justicifation of Admission Dx:


Justifications for Admission:


Justification of Admission Dx:  Yes


Acute Renal Failure:  Serum Cr > 4mg/dL











DIPAK COX MD                Haja 15, 2020 13:20

## 2020-06-15 NOTE — DS
DATE OF DISCHARGE:  06/15/2020



ADMISSION DIAGNOSES:  Pyelonephritis, renal failure.



DICTATION ENDS HERE

 



______________________________

ONI HALE DO



DR:  CHILO/javier  JOB#:  [id number] / [id number]

DD:  06/15/2020 10:07  DT:  06/15/2020 10:11

## 2020-06-15 NOTE — NUR
SW following for discharge planning. Reviewed chart and coordinated care with RN and Dr. Burgos. Pt to discharge home today with family. Pt has home 02 and HCBS. Pt's renal failure 
resolving with no need for new dialysis setup per Dr. Burgos. Pt to discharge on oral 
medications. No further SW needs at this time.

## 2020-06-15 NOTE — NUR
Discharge Note:



CECI GEIGER



Discharge instructions and discharge home medications reviewed with Patient and a copy 
given. All questions have been answered and understanding verbalized. 



The following instructions and handouts were given: discharge instructions, education and 
follow up recommendations.



Discontinued lines and drains: Peripheral IV discontinued intact.



Patient discharged to Home or Self Care with Family Member via Wheelchair off unity by PEGGY.

## 2020-06-15 NOTE — PDOC
[employer] PROGRESS NOTE


Chief Complaint


Chief Complaint


New abdominal/pelvic mass discovered on CAT scan 


Pyelonephritis


Acute kidney injury with a creatinine of 4.5 (it was 0.9 just a year ago)


CHF, COPD, diabetes, hypertension, hyperlipidemia,


obesity, cholecystectomy, hysterectomy, tubal ligation, bladder cyst, right leg


surgery.





History of Present Illness


History of Present Illness


6/15/2020


Patient seen and examined


She seems to be at her baseline


Labs are still pending


I hope to discharge later today if labs are reasonable and if okay with 

consultants


If discharge she will follow-up at  or [facility] for 

evaluation of the mass








6/14/2020


Patient seen and examined


She is approaching her baseline


Creatinine is down to 1.8


Discussed with RN


Chart reviewed











6/13/2020


Patient seen and examined


Discussed with her daughter and her son-in-law


Discussed with RN


Creatinine is down to 2.5


Ca-125 level was within normal limits at 9


CEA level pending


Chart reviewed








6/12/2020


Patient seen and examined


She is tolerating her diet


Chart reviewed


Discussed with RN


Awaiting GI OB/GYN oncology and nephrology consults for later today





Vitals/I&O


Vitals/I&O:





                                   Vital Signs








  Date Time  Temp Pulse Resp B/P (MAP) Pulse Ox O2 Delivery O2 Flow Rate FiO2


 


6/15/20 07:53      Nasal Cannula 3.0 


 


6/15/20 07:38 98.1 74 18 126/55 (78) 96   





 98.1       














                                    I & O   


 


 6/14/20 6/14/20 6/15/20





 15:00 23:00 07:00


 


Intake Total 240 ml 950 ml 340 ml


 


Balance 240 ml 950 ml 340 ml











Physical Exam


General:  Alert, Cooperative


Heart:  Regular rate


Abdomen:  Soft, No tenderness, Other (pelvic deferred.)


Extremities:  No cyanosis


Skin:  No rashes





Labs


Labs:





Laboratory Tests








Test


 6/14/20


11:42 6/14/20


20:23 6/15/20


07:19


 


Glucose (Fingerstick)


 145 mg/dL


(70-99) 118 mg/dL


(70-99) 148 mg/dL


(70-99)











Assessment and Plan


Assessmemt and Plan


Problems


Medical Problems:


(1) Abdominal mass


Status: Acute  





(2) Hypomagnesemia


Status: Acute  





(3) Pyelonephritis


Status: Acute  





(4) Renal failure


Status: Acute  





New abdominal/pelvic mass discovered on CAT scan yesterday (10 cm) (but her CA 

125 level is within normal limits)


Pyelonephritis


Acute kidney injury with a creatinine of 4.5 (it was 0.9 just a year ago)


CHF, COPD, diabetes, hypertension, hyperlipidemia,


obesity, cholecystectomy, hysterectomy, tubal ligation, bladder cyst, right leg


surgery.





Plan


Probable discharge later today


For now see the following:


Await CEA level


Home meds


Trending creatinine and it is improving a lot


Trending white count and it is improving a lot as well


DVT prophylaxis


IV fluids


IV antibiotics


Trend labs


Full code


I spoke with , he would like to have the patient follow-up at  or 

[facility] after discharge





Comment


Review of Relevant


I have reviewed the following items ana (where applicable) has been applied.





Justicifation of Admission Dx:


Justifications for Admission:


Justification of Admission Dx:  Yes


Acute Renal Failure:  Serum Cr > 4mg/dL











ONI HALE III DO           Haja 15, 2020 09:33

## 2020-08-14 ENCOUNTER — HOSPITAL ENCOUNTER (EMERGENCY)
Dept: HOSPITAL 61 - ER | Age: 65
Discharge: LEFT BEFORE DISCHARGE | End: 2020-08-14
Payer: COMMERCIAL

## 2020-08-14 DIAGNOSIS — M79.604: Primary | ICD-10-CM

## 2020-08-14 DIAGNOSIS — M79.605: ICD-10-CM

## 2020-08-14 DIAGNOSIS — Z53.21: ICD-10-CM

## 2021-05-07 ENCOUNTER — HOSPITAL ENCOUNTER (OUTPATIENT)
Dept: HOSPITAL 61 - SURG | Age: 66
Discharge: HOME | End: 2021-05-07
Attending: OPHTHALMOLOGY
Payer: COMMERCIAL

## 2021-05-07 VITALS — BODY MASS INDEX: 53.37 KG/M2 | WEIGHT: 290 LBS | HEIGHT: 62 IN

## 2021-05-07 VITALS — DIASTOLIC BLOOD PRESSURE: 79 MMHG | SYSTOLIC BLOOD PRESSURE: 179 MMHG

## 2021-05-07 DIAGNOSIS — Z79.82: ICD-10-CM

## 2021-05-07 DIAGNOSIS — Z98.890: ICD-10-CM

## 2021-05-07 DIAGNOSIS — Z79.899: ICD-10-CM

## 2021-05-07 DIAGNOSIS — Z79.84: ICD-10-CM

## 2021-05-07 DIAGNOSIS — K21.9: ICD-10-CM

## 2021-05-07 DIAGNOSIS — J43.9: ICD-10-CM

## 2021-05-07 DIAGNOSIS — E11.36: Primary | ICD-10-CM

## 2021-05-07 DIAGNOSIS — H25.89: ICD-10-CM

## 2021-05-07 DIAGNOSIS — Z20.822: ICD-10-CM

## 2021-05-07 DIAGNOSIS — E78.00: ICD-10-CM

## 2021-05-07 DIAGNOSIS — Z98.51: ICD-10-CM

## 2021-05-07 DIAGNOSIS — I10: ICD-10-CM

## 2021-05-07 DIAGNOSIS — Z90.49: ICD-10-CM

## 2021-05-07 DIAGNOSIS — Z87.891: ICD-10-CM

## 2021-05-07 DIAGNOSIS — F32.9: ICD-10-CM

## 2021-05-07 DIAGNOSIS — Z90.710: ICD-10-CM

## 2021-05-07 DIAGNOSIS — E03.9: ICD-10-CM

## 2021-05-07 DIAGNOSIS — F41.9: ICD-10-CM

## 2021-05-07 PROCEDURE — 87426 SARSCOV CORONAVIRUS AG IA: CPT

## 2021-05-07 PROCEDURE — 66984 XCAPSL CTRC RMVL W/O ECP: CPT

## 2021-05-07 PROCEDURE — V2632 POST CHMBR INTRAOCULAR LENS: HCPCS

## 2021-05-07 RX ADMIN — CYCLOPENTOLATE HYDROCHLORIDE SCH DROP: 20 SOLUTION/ DROPS OPHTHALMIC at 10:07

## 2021-05-07 RX ADMIN — CYCLOPENTOLATE HYDROCHLORIDE SCH DROP: 20 SOLUTION/ DROPS OPHTHALMIC at 10:13

## 2021-05-07 RX ADMIN — CYCLOPENTOLATE HYDROCHLORIDE SCH DROP: 20 SOLUTION/ DROPS OPHTHALMIC at 10:00

## 2021-05-07 RX ADMIN — PHENYLEPHRINE HYDROCHLORIDE SCH DROP: 100 SOLUTION/ DROPS OPHTHALMIC at 10:00

## 2021-05-07 RX ADMIN — PHENYLEPHRINE HYDROCHLORIDE SCH DROP: 100 SOLUTION/ DROPS OPHTHALMIC at 10:07

## 2021-05-07 RX ADMIN — PHENYLEPHRINE HYDROCHLORIDE SCH DROP: 100 SOLUTION/ DROPS OPHTHALMIC at 10:13

## 2021-05-07 NOTE — OP
DATE OF SURGERY: 05/07/2021

PREOPERATIVE DIAGNOSIS:  Cataract of the left eye.



PROCEDURE:  Phacoemulsification with posterior chamber intraocular lens 

implantation of the left eye.



INDICATIONS:  Painless progressive visual loss and visually significant 

cataract.



SURGEON:  Sonam Knight MD



ANESTHESIA:  Topical with monitored anesthesia care.



DESCRIPTION OF PROCEDURE:  The left eye was prepped with Betadine in the usual 

sterile fashion and draped.  A paracentesis was performed followed by 

instillation of preservative-free phenylephrine admixed with lidocaine and 

balanced salt solution.  A temporal clear corneal incision was made followed by 

instillation of viscoelastic.  A capsulorrhexis was performed, followed by 

hydrodissection.  The phacoemulsification handpiece was used to remove the 

nucleus.  The I/A handpiece was used to remove the cortex.  Viscoelastic was 

injected in the capsular bag and the Darren model [id number] with a power of 17.0 

diopters was placed into the capsular bag.  Balanced salt solution was used to 

hydrate the corneal wound and the viscoelastic evacuated with the I/A handpiece.

 Once no leak was noted, Maxitrol was placed in the eye and the eye was shielded

and the patient was sent to the recovery room uneventfully.







MARIA VICTORIA

DR: Belen   DD: 05/07/2021 11:35

DT: 05/07/2021 11:45   TID: [id number]

## 2021-05-14 ENCOUNTER — HOSPITAL ENCOUNTER (OUTPATIENT)
Dept: HOSPITAL 61 - SURG | Age: 66
Discharge: HOME | End: 2021-05-14
Attending: OPHTHALMOLOGY
Payer: COMMERCIAL

## 2021-05-14 VITALS — DIASTOLIC BLOOD PRESSURE: 86 MMHG | SYSTOLIC BLOOD PRESSURE: 196 MMHG

## 2021-05-14 VITALS — HEIGHT: 62 IN | BODY MASS INDEX: 53.15 KG/M2 | WEIGHT: 288.81 LBS

## 2021-05-14 VITALS — SYSTOLIC BLOOD PRESSURE: 158 MMHG | DIASTOLIC BLOOD PRESSURE: 67 MMHG

## 2021-05-14 DIAGNOSIS — H25.89: ICD-10-CM

## 2021-05-14 DIAGNOSIS — Z79.82: ICD-10-CM

## 2021-05-14 DIAGNOSIS — Z79.899: ICD-10-CM

## 2021-05-14 DIAGNOSIS — Z90.710: ICD-10-CM

## 2021-05-14 DIAGNOSIS — E03.9: ICD-10-CM

## 2021-05-14 DIAGNOSIS — F41.9: ICD-10-CM

## 2021-05-14 DIAGNOSIS — Z98.51: ICD-10-CM

## 2021-05-14 DIAGNOSIS — I10: ICD-10-CM

## 2021-05-14 DIAGNOSIS — F32.9: ICD-10-CM

## 2021-05-14 DIAGNOSIS — Z98.890: ICD-10-CM

## 2021-05-14 DIAGNOSIS — E78.00: ICD-10-CM

## 2021-05-14 DIAGNOSIS — Z88.8: ICD-10-CM

## 2021-05-14 DIAGNOSIS — E11.36: Primary | ICD-10-CM

## 2021-05-14 DIAGNOSIS — Z79.84: ICD-10-CM

## 2021-05-14 DIAGNOSIS — K21.9: ICD-10-CM

## 2021-05-14 DIAGNOSIS — J43.9: ICD-10-CM

## 2021-05-14 DIAGNOSIS — Z87.891: ICD-10-CM

## 2021-05-14 PROCEDURE — 82962 GLUCOSE BLOOD TEST: CPT

## 2021-05-14 PROCEDURE — V2632 POST CHMBR INTRAOCULAR LENS: HCPCS

## 2021-05-14 PROCEDURE — 66984 XCAPSL CTRC RMVL W/O ECP: CPT

## 2021-05-14 RX ADMIN — CYCLOPENTOLATE HYDROCHLORIDE SCH DROP: 10 SOLUTION/ DROPS OPHTHALMIC at 09:45

## 2021-05-14 RX ADMIN — PHENYLEPHRINE HYDROCHLORIDE SCH DROP: 100 SOLUTION/ DROPS OPHTHALMIC at 09:50

## 2021-05-14 RX ADMIN — PHENYLEPHRINE HYDROCHLORIDE SCH DROP: 100 SOLUTION/ DROPS OPHTHALMIC at 08:45

## 2021-05-14 RX ADMIN — CYCLOPENTOLATE HYDROCHLORIDE SCH DROP: 10 SOLUTION/ DROPS OPHTHALMIC at 08:45

## 2021-05-14 RX ADMIN — CYCLOPENTOLATE HYDROCHLORIDE SCH DROP: 10 SOLUTION/ DROPS OPHTHALMIC at 09:50

## 2021-05-14 RX ADMIN — PROPARACAINE HYDROCHLORIDE ONE DROP: 5 SOLUTION/ DROPS OPHTHALMIC at 08:44

## 2021-05-14 RX ADMIN — PROPARACAINE HYDROCHLORIDE ONE DROP: 5 SOLUTION/ DROPS OPHTHALMIC at 09:44

## 2021-05-14 RX ADMIN — PHENYLEPHRINE HYDROCHLORIDE SCH DROP: 100 SOLUTION/ DROPS OPHTHALMIC at 09:45

## 2021-05-14 NOTE — OP
DATE OF SURGERY: 05/14/2021

PREOPERATIVE DIAGNOSIS:  Cataract of the right eye.



PROCEDURE:  Phacoemulsification with posterior chamber intraocular lens 

implantation of the right eye.



INDICATIONS: Painless progressive visual loss and visually significant cataract 

and difficulty reading.



SURGEON:  Sonam Knight MD



ANESTHESIA:  Topical with monitored anesthesia care.



DESCRIPTION OF PROCEDURE:  The right eye was prepped with Betadine in the usual 

sterile fashion and draped.  A paracentesis was performed followed by 

instillation of preservative-free phenylephrine admixed with lidocaine and 

balanced salt solution.  A temporal clear corneal incision was made followed by 

instillation of viscoelastic.  A capsulorrhexis was performed, followed by 

hydrodissection.  The phacoemulsification handpiece was used to remove the 

nucleus and the I/A handpiece was used to remove cortex.  Viscoelastic was 

injected in the capsular bag and the Darren model [id number] with a power of 17.0 

diopters was placed into the capsular bag.  Balanced salt solution was used to 

hydrate the corneal wound and the viscoelastic evacuated with the I/A handpiece.

 Once no leak was noted, Maxitrol was placed on the eye and the eye was shielded

and the patient was sent to the recovery room uneventfully.







LUDY

DR: Belen   DD: 05/14/2021 11:48

DT: 05/14/2021 12:30   TID: [id number]